# Patient Record
Sex: FEMALE | Race: WHITE | NOT HISPANIC OR LATINO
[De-identification: names, ages, dates, MRNs, and addresses within clinical notes are randomized per-mention and may not be internally consistent; named-entity substitution may affect disease eponyms.]

---

## 2018-11-30 ENCOUNTER — RECORD ABSTRACTING (OUTPATIENT)
Age: 43
End: 2018-11-30

## 2018-11-30 DIAGNOSIS — Z87.898 PERSONAL HISTORY OF OTHER SPECIFIED CONDITIONS: ICD-10-CM

## 2018-11-30 DIAGNOSIS — J40 BRONCHITIS, NOT SPECIFIED AS ACUTE OR CHRONIC: ICD-10-CM

## 2018-11-30 DIAGNOSIS — Z87.59 PERSONAL HISTORY OF OTHER COMPLICATIONS OF PREGNANCY, CHILDBIRTH AND THE PUERPERIUM: ICD-10-CM

## 2018-11-30 DIAGNOSIS — M79.18 MYALGIA, OTHER SITE: ICD-10-CM

## 2018-11-30 DIAGNOSIS — Z82.3 FAMILY HISTORY OF STROKE: ICD-10-CM

## 2018-11-30 DIAGNOSIS — M62.838 OTHER MUSCLE SPASM: ICD-10-CM

## 2018-11-30 DIAGNOSIS — R68.89 OTHER GENERAL SYMPTOMS AND SIGNS: ICD-10-CM

## 2018-11-30 DIAGNOSIS — J30.2 OTHER SEASONAL ALLERGIC RHINITIS: ICD-10-CM

## 2018-11-30 DIAGNOSIS — R09.81 NASAL CONGESTION: ICD-10-CM

## 2018-11-30 DIAGNOSIS — Z80.0 FAMILY HISTORY OF MALIGNANT NEOPLASM OF DIGESTIVE ORGANS: ICD-10-CM

## 2018-11-30 DIAGNOSIS — Z82.49 FAMILY HISTORY OF ISCHEMIC HEART DISEASE AND OTHER DISEASES OF THE CIRCULATORY SYSTEM: ICD-10-CM

## 2018-11-30 DIAGNOSIS — J32.9 CHRONIC SINUSITIS, UNSPECIFIED: ICD-10-CM

## 2018-11-30 DIAGNOSIS — Z91.030 BEE ALLERGY STATUS: ICD-10-CM

## 2018-11-30 DIAGNOSIS — Z92.89 PERSONAL HISTORY OF OTHER MEDICAL TREATMENT: ICD-10-CM

## 2018-11-30 DIAGNOSIS — M25.569 PAIN IN UNSPECIFIED KNEE: ICD-10-CM

## 2018-11-30 DIAGNOSIS — M79.603 PAIN IN ARM, UNSPECIFIED: ICD-10-CM

## 2018-11-30 RX ORDER — FEXOFENADINE HYDROCHLORIDE 180 MG/1
180 TABLET, FILM COATED ORAL DAILY
Refills: 0 | Status: ACTIVE | COMMUNITY

## 2018-11-30 RX ORDER — GLUCOSAMINE HCL/CHONDROITIN SU 500-400 MG
3 CAPSULE ORAL
Refills: 0 | Status: ACTIVE | COMMUNITY

## 2018-12-03 ENCOUNTER — APPOINTMENT (OUTPATIENT)
Dept: GASTROENTEROLOGY | Facility: CLINIC | Age: 43
End: 2018-12-03
Payer: COMMERCIAL

## 2018-12-03 VITALS
WEIGHT: 145 LBS | BODY MASS INDEX: 24.75 KG/M2 | HEIGHT: 64 IN | DIASTOLIC BLOOD PRESSURE: 80 MMHG | SYSTOLIC BLOOD PRESSURE: 120 MMHG | HEART RATE: 70 BPM

## 2018-12-03 DIAGNOSIS — Z87.898 PERSONAL HISTORY OF OTHER SPECIFIED CONDITIONS: ICD-10-CM

## 2018-12-03 PROCEDURE — 99213 OFFICE O/P EST LOW 20 MIN: CPT

## 2018-12-03 RX ORDER — ALBUTEROL SULFATE 90 UG/1
108 AEROSOL, METERED RESPIRATORY (INHALATION)
Refills: 0 | Status: DISCONTINUED | COMMUNITY
End: 2018-12-03

## 2018-12-03 RX ORDER — AZITHROMYCIN DIHYDRATE 250 MG/1
250 TABLET, FILM COATED ORAL
Refills: 0 | Status: DISCONTINUED | COMMUNITY
End: 2018-12-03

## 2018-12-03 RX ORDER — AMOXICILLIN AND CLAVULANATE POTASSIUM 875; 125 MG/1; 1/1
875-125 TABLET, FILM COATED ORAL
Refills: 0 | Status: DISCONTINUED | COMMUNITY
End: 2018-12-03

## 2018-12-03 RX ORDER — MOMETASONE FUROATE MONOHYDRATE 50 UG/1
50 SPRAY, METERED NASAL
Refills: 0 | Status: DISCONTINUED | COMMUNITY
End: 2018-12-03

## 2018-12-03 RX ORDER — FLUCONAZOLE 150 MG/1
150 TABLET ORAL
Refills: 0 | Status: DISCONTINUED | COMMUNITY
End: 2018-12-03

## 2019-01-23 ENCOUNTER — APPOINTMENT (OUTPATIENT)
Dept: INTERNAL MEDICINE | Facility: CLINIC | Age: 44
End: 2019-01-23
Payer: COMMERCIAL

## 2019-01-23 VITALS
DIASTOLIC BLOOD PRESSURE: 76 MMHG | TEMPERATURE: 98.6 F | BODY MASS INDEX: 24.75 KG/M2 | OXYGEN SATURATION: 99 % | SYSTOLIC BLOOD PRESSURE: 120 MMHG | HEART RATE: 74 BPM | WEIGHT: 145 LBS | HEIGHT: 64 IN

## 2019-01-23 DIAGNOSIS — Z87.440 PERSONAL HISTORY OF URINARY (TRACT) INFECTIONS: ICD-10-CM

## 2019-01-23 PROCEDURE — 90471 IMMUNIZATION ADMIN: CPT

## 2019-01-23 PROCEDURE — 90707 MMR VACCINE SC: CPT

## 2019-01-23 PROCEDURE — 99213 OFFICE O/P EST LOW 20 MIN: CPT | Mod: 25

## 2019-01-25 NOTE — HISTORY OF PRESENT ILLNESS
[FreeTextEntry8] : UTI SYMPS- PATIENT HAS BEEN ON 3 DIFF ABX\par \par Patient presents to the office today for recurrent uti. Patient is now in a new relationship and is very happy. HOwever has had UTi 3 times. Patient reports burning with urination.\par \par

## 2019-01-29 LAB
APPEARANCE: CLEAR
BILIRUBIN URINE: NEGATIVE
BLOOD URINE: NEGATIVE
COLOR: YELLOW
GLUCOSE QUALITATIVE U: NEGATIVE MG/DL
KETONES URINE: NEGATIVE
LEUKOCYTE ESTERASE URINE: NEGATIVE
NITRITE URINE: NEGATIVE
PH URINE: 6.5
PROTEIN URINE: NEGATIVE MG/DL
SPECIFIC GRAVITY URINE: 1.01
UROBILINOGEN URINE: NEGATIVE MG/DL

## 2019-09-09 ENCOUNTER — APPOINTMENT (OUTPATIENT)
Dept: INTERNAL MEDICINE | Facility: CLINIC | Age: 44
End: 2019-09-09
Payer: COMMERCIAL

## 2019-09-09 VITALS
BODY MASS INDEX: 24.75 KG/M2 | OXYGEN SATURATION: 98 % | WEIGHT: 145 LBS | HEART RATE: 82 BPM | HEIGHT: 64 IN | SYSTOLIC BLOOD PRESSURE: 120 MMHG | DIASTOLIC BLOOD PRESSURE: 72 MMHG

## 2019-09-09 DIAGNOSIS — D50.0 IRON DEFICIENCY ANEMIA SECONDARY TO BLOOD LOSS (CHRONIC): ICD-10-CM

## 2019-09-09 DIAGNOSIS — F90.9 ATTENTION-DEFICIT HYPERACTIVITY DISORDER, UNSPECIFIED TYPE: ICD-10-CM

## 2019-09-09 DIAGNOSIS — D50.9 IRON DEFICIENCY ANEMIA, UNSPECIFIED: ICD-10-CM

## 2019-09-09 PROCEDURE — 99214 OFFICE O/P EST MOD 30 MIN: CPT

## 2019-09-11 ENCOUNTER — RX RENEWAL (OUTPATIENT)
Age: 44
End: 2019-09-11

## 2019-09-11 PROBLEM — F90.9 ADHD: Status: ACTIVE | Noted: 2019-09-09

## 2019-09-11 PROBLEM — D50.0 IRON DEFICIENCY ANEMIA DUE TO CHRONIC BLOOD LOSS: Status: ACTIVE | Noted: 2019-09-11

## 2019-09-11 PROBLEM — D50.9 IRON DEFICIENCY ANEMIA: Status: ACTIVE | Noted: 2019-09-11

## 2019-09-12 ENCOUNTER — RESULT REVIEW (OUTPATIENT)
Age: 44
End: 2019-09-12

## 2020-06-12 ENCOUNTER — APPOINTMENT (OUTPATIENT)
Dept: GASTROENTEROLOGY | Facility: CLINIC | Age: 45
End: 2020-06-12
Payer: COMMERCIAL

## 2020-06-12 VITALS
BODY MASS INDEX: 23.9 KG/M2 | SYSTOLIC BLOOD PRESSURE: 120 MMHG | WEIGHT: 140 LBS | HEIGHT: 64 IN | DIASTOLIC BLOOD PRESSURE: 80 MMHG | TEMPERATURE: 98 F | HEART RATE: 70 BPM

## 2020-06-12 DIAGNOSIS — Z12.11 ENCOUNTER FOR SCREENING FOR MALIGNANT NEOPLASM OF COLON: ICD-10-CM

## 2020-06-12 PROCEDURE — 99214 OFFICE O/P EST MOD 30 MIN: CPT

## 2020-06-12 RX ORDER — PANTOPRAZOLE 40 MG/1
40 TABLET, DELAYED RELEASE ORAL
Refills: 0 | Status: DISCONTINUED | COMMUNITY
End: 2020-06-12

## 2020-06-12 RX ORDER — ASPIRIN ENTERIC COATED TABLETS 81 MG 81 MG/1
81 TABLET, DELAYED RELEASE ORAL DAILY
Refills: 0 | Status: DISCONTINUED | COMMUNITY
End: 2020-06-12

## 2020-06-12 RX ORDER — CIPROFLOXACIN HYDROCHLORIDE 250 MG/1
250 TABLET, FILM COATED ORAL
Qty: 10 | Refills: 0 | Status: DISCONTINUED | COMMUNITY
Start: 2019-01-23 | End: 2020-06-12

## 2020-06-12 RX ORDER — DEXLANSOPRAZOLE 30 MG/1
30 CAPSULE, DELAYED RELEASE ORAL DAILY
Qty: 90 | Refills: 1 | Status: DISCONTINUED | COMMUNITY
Start: 2018-12-03 | End: 2020-06-12

## 2020-06-12 RX ORDER — SERTRALINE 25 MG/1
25 TABLET, FILM COATED ORAL DAILY
Qty: 90 | Refills: 0 | Status: DISCONTINUED | COMMUNITY
Start: 2018-12-04 | End: 2020-06-12

## 2020-06-12 RX ORDER — DEXMETHYLPHENIDATE HYDROCHLORIDE 10 MG/1
10 CAPSULE, EXTENDED RELEASE ORAL DAILY
Qty: 30 | Refills: 0 | Status: DISCONTINUED | COMMUNITY
Start: 2019-09-09 | End: 2020-06-12

## 2020-06-12 NOTE — ASSESSMENT
[FreeTextEntry1] : Will plan on an upper endoscopy for GERD.  Explained risks/benefits/alternatives including not limited to bleeding, infection, perforation, missed lesions, anesthesia complications.  Patient understands and agrees, all questions answered.\par \par Will plan on a colonoscopy for screening given her family history of an appendiceal cancer in her brother.  Explained risks/benefits/alternatives including not limited to bleeding, infection, perforation, missed lesions, anesthesia complications.  Patient understands and agrees, all questions answered.  Will use a split dose miralax/gatorade prep with clears the day prior.

## 2020-06-12 NOTE — HISTORY OF PRESENT ILLNESS
Reason For Visit    Patient presents for follow-up . Risperdal Consta 50 mg given to right deltoid muscle. Lot # [id number], Expiration date 04/19. Patient tolerated injection well.      Allergies   1. No Known Drug Allergies    Signatures   Electronically signed by : Sole Romeo R.N.; May  4 2017 11:54AM CST (Author)    
[FreeTextEntry1] : Ms. Gross returns for f/u today.\par \chiara Most recently seen 12/18 for LUQ discomfort in the setting of aspirin use, improved with Dexilant.  Prior to that she had an EGD 8/18 showing erosive gastritis.\par \par She had been well until the past few weeks when she developed recurrent burning LUQ discomfort, no radiation, not associated with eating.  No dysphagia, odynophagia, N/V, black stool, rectal bleeding, weight change.\par \par She recently started taking omeprazole for this discomfort, continues to have her discomfort despite the medication.\par \par She also reports a change in bowel habits, used to be mildly constipated, over the past few months she has been having 1-3 soft or loose brown BMs daily.  No mucus in stool.  Does have lower abdominal cramps that are relieved by passing stool.\par \chiara Does not smoke, drinks socially.\par \par Of note, her brother had an appendiceal cancer s/p surgical resection, did not need chemo.\par \chiara Has never had a colonoscopy.

## 2020-06-12 NOTE — PHYSICAL EXAM
[General Appearance - Alert] : alert [General Appearance - In No Acute Distress] : in no acute distress [Sclera] : the sclera and conjunctiva were normal [Outer Ear] : the ears and nose were normal in appearance [Neck Appearance] : the appearance of the neck was normal [] : no respiratory distress [Abdomen Soft] : soft [Abdomen Tenderness] : non-tender [Abnormal Walk] : normal gait [Skin Color & Pigmentation] : normal skin color and pigmentation [Oriented To Time, Place, And Person] : oriented to person, place, and time [Cranial Nerves] : cranial nerves 2-12 were intact

## 2020-07-19 ENCOUNTER — RESULT REVIEW (OUTPATIENT)
Age: 45
End: 2020-07-19

## 2020-07-21 ENCOUNTER — RESULT REVIEW (OUTPATIENT)
Age: 45
End: 2020-07-21

## 2020-07-22 ENCOUNTER — APPOINTMENT (OUTPATIENT)
Dept: GASTROENTEROLOGY | Facility: HOSPITAL | Age: 45
End: 2020-07-22

## 2020-07-22 ENCOUNTER — RESULT REVIEW (OUTPATIENT)
Age: 45
End: 2020-07-22

## 2020-12-21 PROBLEM — Z87.440 HISTORY OF URINARY TRACT INFECTION: Status: RESOLVED | Noted: 2019-01-23 | Resolved: 2020-12-21

## 2021-04-15 ENCOUNTER — APPOINTMENT (OUTPATIENT)
Dept: INTERNAL MEDICINE | Facility: CLINIC | Age: 46
End: 2021-04-15
Payer: COMMERCIAL

## 2021-04-15 VITALS
BODY MASS INDEX: 23.9 KG/M2 | DIASTOLIC BLOOD PRESSURE: 72 MMHG | SYSTOLIC BLOOD PRESSURE: 110 MMHG | WEIGHT: 140 LBS | HEIGHT: 64 IN | OXYGEN SATURATION: 99 % | HEART RATE: 76 BPM

## 2021-04-15 DIAGNOSIS — M79.18 MYALGIA, OTHER SITE: ICD-10-CM

## 2021-04-15 PROCEDURE — 99072 ADDL SUPL MATRL&STAF TM PHE: CPT

## 2021-04-15 PROCEDURE — 99214 OFFICE O/P EST MOD 30 MIN: CPT

## 2021-06-11 ENCOUNTER — APPOINTMENT (OUTPATIENT)
Dept: INTERNAL MEDICINE | Facility: CLINIC | Age: 46
End: 2021-06-11
Payer: COMMERCIAL

## 2021-06-11 VITALS
BODY MASS INDEX: 23.9 KG/M2 | WEIGHT: 140 LBS | HEIGHT: 64 IN | SYSTOLIC BLOOD PRESSURE: 120 MMHG | HEART RATE: 72 BPM | DIASTOLIC BLOOD PRESSURE: 62 MMHG | OXYGEN SATURATION: 98 %

## 2021-06-11 PROCEDURE — 99214 OFFICE O/P EST MOD 30 MIN: CPT

## 2021-06-14 ENCOUNTER — RESULT REVIEW (OUTPATIENT)
Age: 46
End: 2021-06-14

## 2021-06-28 ENCOUNTER — RESULT REVIEW (OUTPATIENT)
Age: 46
End: 2021-06-28

## 2021-11-02 ENCOUNTER — NON-APPOINTMENT (OUTPATIENT)
Age: 46
End: 2021-11-02

## 2021-11-02 ENCOUNTER — APPOINTMENT (OUTPATIENT)
Dept: PAIN MANAGEMENT | Facility: CLINIC | Age: 46
End: 2021-11-02
Payer: COMMERCIAL

## 2021-11-02 VITALS
BODY MASS INDEX: 25.61 KG/M2 | SYSTOLIC BLOOD PRESSURE: 121 MMHG | WEIGHT: 150 LBS | DIASTOLIC BLOOD PRESSURE: 89 MMHG | HEIGHT: 64 IN | TEMPERATURE: 98 F

## 2021-11-02 DIAGNOSIS — M51.36 OTHER INTERVERTEBRAL DISC DEGENERATION, LUMBAR REGION: ICD-10-CM

## 2021-11-02 PROCEDURE — 99204 OFFICE O/P NEW MOD 45 MIN: CPT

## 2021-11-02 NOTE — PHYSICAL EXAM

## 2021-11-02 NOTE — DATA REVIEWED
[FreeTextEntry1] : 06/28/21\par  MRI LUMBAR SPINE\par \par  FINDINGS AT MULTIPLE LEVELS: There is disc degeneration at L1-L2, L4-L5, L5-S1 with loss of normal\par high signal in the intervertebral discs on the sagittal T2-weighted sequence.\par \par  FINDINGS AT SPECIFIC LEVELS:\par \par      L5-S1: No bulge or herniation.\par \par      L4-L5: Mild facet osteoarthritis. Mild disc bulge with associated left-sided annular fissure.\par \par      L3-L4: No bulge or herniation.\par \par      L2-L3: No bulge or herniation.\par \par      L1-L2: Minimal disc thinning. Mild disc bulging.\par \par      T12-L1: No bulge or herniation.\par \par  INTRADURAL SPACE AND CONUS MEDULLARIS: No lesion demonstrated.\par \par  BONE MARROW SIGNAL: Subchondral degenerative changes L5-S1.\par \par  ALIGNMENT: No change in mild thoracolumbar levoscoliosis.\par \par  PARASPINAL SOFT TISSUES: There is no paraspinal mass. There are small bilateral adnexal cysts. In a\par premenopausal patient, the findings are consistent with physiologic polyp.\par \par \par \par  IMPRESSION: Mild disc bulging. No disc herniation or spinal stenosis.\par

## 2021-11-02 NOTE — HISTORY OF PRESENT ILLNESS
[4] : 3. What number best describes how, during the past week, pain has interfered with your general activity? 4/10 pain [___ yrs] : [unfilled] year(s) ago [Paroxysmal] : paroxysmal [5] : an average pain level of 5/10 [1] : a minimum pain level of 1/10 [10] : a maximum pain level of 10/10 [Sharp] : sharp [Dull] : dull [Aching] : aching [Throbbing] : throbbing [Burning] : burning [Shooting] : shooting [Electric] : electric [Sitting] : sitting [Medications] : medications [Heat] : heat [FreeTextEntry1] : 46 yof presents w/ low back pain that radiates down the left lower extremity. Pain began after doing HIT workouts and running more. Quality of life is impaired. There has been a severe exacerbation of the patient's chronic pain. She works as a psychologist. Cannot lift due to pain.  [FreeTextEntry2] : 12 [FreeTextEntry7] : No referral.   Lower back bilateral legs mostly left side . [FreeTextEntry3] : exercise [FreeTextEntry4] : exercise

## 2021-11-02 NOTE — ASSESSMENT
[FreeTextEntry1] : 46 yof w/ low back pain that radiates down the left leg.\par \par I have personally reviewed the patient's MRI in detail and discussed it with them which is significant for disc bulge and annular fissure at L4-L5.\par \par The patient has failed to have relief with medication management and physical therapy. Given the patients failure to improve with all other conservative measures, recommend left L4-L5 transforaminal epidural steroid injection under fluoroscopic guidance. The patient will follow-up with me in my office two weeks following intervention.\par \par I have discussed in detail with the patient that an interventional spine procedure is associated with potential risks. The procedure may include an injection of steroid and potentially other medications (local anesthetic and normal saline) into the epidural space or surrounding tissue of the spine. There are significant risks of this procedure which include and are not limited to infection, bleeding, worsening pain, dural puncture leading to post-dural puncture headache, nerve damage, spinal cord injury, paralysis, stroke, and death. There is a chance that the procedure does not improve their pain. There are risks associated with the steroid being absorbed into the body systemically. These include dysphoria, difficulty sleeping, mood swings, and personality changes. Pre-menopausal women may notice a regularity his in her menstrual cycle for 2-3 months following the injection. Steroids can specifically affect patients with hypertension, diabetes, and peptic ulcers. The procedure may cause a temporary increase in blood pressure and blood glucose, and may adversely affect a peptic ulcer. Other, more rare complications, including avascular necrosis of the joints, glaucoma, and osteoporosis. I have discussed the risks of the procedure at length with the patient, and the potential benefits of pain relief. I have offered alternatives to the procedure. All questions were answered. The patient expressed understanding and wishes to proceed with the procedure.\par \par Acetaminophen 1,000 mg q8h prn

## 2021-11-02 NOTE — REVIEW OF SYSTEMS
[Back Pain] : back pain [Neck Pain] : neck pain [Muscle Pain] : muscle pain [Numbness] : numbness [Negative] : Heme/Lymph

## 2021-11-02 NOTE — CONSULT LETTER
[Dear  ___] : Dear  [unfilled], [Consult Letter:] : I had the pleasure of evaluating your patient, [unfilled]. [Please see my note below.] : Please see my note below. [Consult Closing:] : Thank you very much for allowing me to participate in the care of this patient.  If you have any questions, please do not hesitate to contact me. [Sincerely,] : Sincerely, [FreeTextEntry3] : Germain Odonnell MD\par

## 2021-11-08 ENCOUNTER — RESULT REVIEW (OUTPATIENT)
Age: 46
End: 2021-11-08

## 2021-11-10 ENCOUNTER — APPOINTMENT (OUTPATIENT)
Dept: PAIN MANAGEMENT | Facility: HOSPITAL | Age: 46
End: 2021-11-10

## 2021-11-10 ENCOUNTER — RESULT REVIEW (OUTPATIENT)
Age: 46
End: 2021-11-10

## 2021-12-02 ENCOUNTER — APPOINTMENT (OUTPATIENT)
Dept: PAIN MANAGEMENT | Facility: CLINIC | Age: 46
End: 2021-12-02
Payer: COMMERCIAL

## 2021-12-02 VITALS
DIASTOLIC BLOOD PRESSURE: 83 MMHG | HEIGHT: 64 IN | WEIGHT: 150 LBS | SYSTOLIC BLOOD PRESSURE: 122 MMHG | TEMPERATURE: 98 F | BODY MASS INDEX: 25.61 KG/M2

## 2021-12-02 DIAGNOSIS — M62.838 OTHER MUSCLE SPASM: ICD-10-CM

## 2021-12-02 PROCEDURE — 99213 OFFICE O/P EST LOW 20 MIN: CPT

## 2021-12-02 NOTE — ASSESSMENT
[FreeTextEntry1] : 46 yof w/ low back pain that radiates down the left leg now s/p GIOVANNY with complete relief of pain.\par \par I have personally reviewed the patient's MRI in detail and discussed it with them which is significant for disc bulge and annular fissure at L4-L5.\par \par Physical therapy prescribed - goal will be to increase ROM, strengthening, postural training, other modalities ad vesta which may include massage and stim.\par \par HEP discussed in detail.\par \par RTC prn.

## 2021-12-02 NOTE — HISTORY OF PRESENT ILLNESS
[4] : 3. What number best describes how, during the past week, pain has interfered with your general activity? 4/10 pain [___ yrs] : [unfilled] year(s) ago [Paroxysmal] : paroxysmal [5] : an average pain level of 5/10 [1] : a minimum pain level of 1/10 [10] : a maximum pain level of 10/10 [Sharp] : sharp [Dull] : dull [Aching] : aching [Throbbing] : throbbing [Burning] : burning [Shooting] : shooting [Electric] : electric [Sitting] : sitting [Medications] : medications [Heat] : heat [FreeTextEntry1] : Interval History:\par She is s/p GIOVANNY with excellent relief. She wishes to discuss HEP. No longer has any pain.\par \par HPI: 46 yof presents w/ low back pain that radiates down the left lower extremity. Pain began after doing HIT workouts and running more. Quality of life is impaired. There has been a severe exacerbation of the patient's chronic pain. She works as a psychologist. Cannot lift due to pain. \par \par Interventions:\par Left L4-L5 TFESI (11/10/21): [FreeTextEntry2] : 12 [FreeTextEntry7] : No referral.   Lower back bilateral legs mostly left side . [FreeTextEntry3] : exercise [FreeTextEntry4] : exercise

## 2021-12-02 NOTE — PHYSICAL EXAM

## 2022-01-13 ENCOUNTER — APPOINTMENT (OUTPATIENT)
Dept: INTERNAL MEDICINE | Facility: CLINIC | Age: 47
End: 2022-01-13
Payer: COMMERCIAL

## 2022-01-13 VITALS
DIASTOLIC BLOOD PRESSURE: 80 MMHG | BODY MASS INDEX: 25.61 KG/M2 | SYSTOLIC BLOOD PRESSURE: 120 MMHG | WEIGHT: 150 LBS | OXYGEN SATURATION: 99 % | HEIGHT: 64 IN | HEART RATE: 82 BPM

## 2022-01-13 PROCEDURE — 99213 OFFICE O/P EST LOW 20 MIN: CPT | Mod: 25

## 2022-01-13 NOTE — HISTORY OF PRESENT ILLNESS
[FreeTextEntry1] : easily bruising  [de-identified] : 1. on and off easy bruising, no other issues. Does not develop large hematomas

## 2022-01-21 LAB
BASOPHILS # BLD AUTO: 0.08 K/UL
BASOPHILS NFR BLD AUTO: 1.4 %
EOSINOPHIL # BLD AUTO: 0.08 K/UL
EOSINOPHIL NFR BLD AUTO: 1.4 %
HCT VFR BLD CALC: 38.4 %
HGB BLD-MCNC: 12.7 G/DL
IMM GRANULOCYTES NFR BLD AUTO: 0 %
LYMPHOCYTES # BLD AUTO: 1.6 K/UL
LYMPHOCYTES NFR BLD AUTO: 27.8 %
MAN DIFF?: NORMAL
MCHC RBC-ENTMCNC: 29.8 PG
MCHC RBC-ENTMCNC: 33.1 GM/DL
MCV RBC AUTO: 90.1 FL
MONOCYTES # BLD AUTO: 0.54 K/UL
MONOCYTES NFR BLD AUTO: 9.4 %
NEUTROPHILS # BLD AUTO: 3.45 K/UL
NEUTROPHILS NFR BLD AUTO: 60 %
PLATELET # BLD AUTO: 409 K/UL
RBC # BLD: 4.26 M/UL
RBC # FLD: 12.5 %
WBC # FLD AUTO: 5.75 K/UL

## 2022-04-08 ENCOUNTER — APPOINTMENT (OUTPATIENT)
Dept: GASTROENTEROLOGY | Facility: CLINIC | Age: 47
End: 2022-04-08
Payer: COMMERCIAL

## 2022-04-08 VITALS
OXYGEN SATURATION: 98 % | HEIGHT: 64 IN | BODY MASS INDEX: 26.46 KG/M2 | DIASTOLIC BLOOD PRESSURE: 72 MMHG | TEMPERATURE: 97.9 F | WEIGHT: 155 LBS | SYSTOLIC BLOOD PRESSURE: 112 MMHG | RESPIRATION RATE: 16 BRPM | HEART RATE: 72 BPM

## 2022-04-08 DIAGNOSIS — R10.13 EPIGASTRIC PAIN: ICD-10-CM

## 2022-04-08 PROCEDURE — 99214 OFFICE O/P EST MOD 30 MIN: CPT

## 2022-04-08 NOTE — ASSESSMENT
[FreeTextEntry1] : Famotidine PRN.\par \par Can continue with probiotics as she has had a good response to them.\par \par Will obtain an abd U/S r/o biliary sources of her discomfort.\par \par Dairy free diet.

## 2022-04-08 NOTE — HISTORY OF PRESENT ILLNESS
[FreeTextEntry1] : Ms. Gross returns for f/u.\par \par Last seen 6/20 for recurrent LUQ discomfort.  She had previously responded well to PPIs.\par \par Approximately 1 month ago she had a diarrheal illness that has since passed.  She has had on and off LUQ burning pain, she is currently taking omeprazole which has helped however has not led to complete resolution.  She has recently been c/o mild RUQ discomfort as well, burning, more mild but similar to her LUQ discomfort. No N/V, regurgitation, heartburn.\par \par EGD/colonoscopy 7/20: cecal tubular adenoma (3 mm), int hemorrhoids, normal EGD\par EGD 8/18: erosive gastritis

## 2022-04-26 ENCOUNTER — APPOINTMENT (OUTPATIENT)
Dept: PAIN MANAGEMENT | Facility: CLINIC | Age: 47
End: 2022-04-26
Payer: COMMERCIAL

## 2022-04-26 VITALS
WEIGHT: 155 LBS | DIASTOLIC BLOOD PRESSURE: 86 MMHG | SYSTOLIC BLOOD PRESSURE: 119 MMHG | HEIGHT: 64 IN | TEMPERATURE: 98 F | BODY MASS INDEX: 26.46 KG/M2

## 2022-04-26 PROCEDURE — 99214 OFFICE O/P EST MOD 30 MIN: CPT

## 2022-04-26 RX ORDER — NAPROXEN 250 MG/1
250 TABLET ORAL
Qty: 20 | Refills: 0 | Status: DISCONTINUED | COMMUNITY
Start: 2021-04-15 | End: 2022-04-26

## 2022-04-26 RX ORDER — FLUTICASONE PROPIONATE 110 UG/1
110 AEROSOL, METERED RESPIRATORY (INHALATION) TWICE DAILY
Qty: 12 | Refills: 3 | Status: DISCONTINUED | COMMUNITY
Start: 2019-09-11 | End: 2022-04-26

## 2022-04-26 RX ORDER — METHOCARBAMOL 500 MG/1
500 TABLET, FILM COATED ORAL 3 TIMES DAILY
Qty: 21 | Refills: 0 | Status: DISCONTINUED | COMMUNITY
Start: 2021-04-20 | End: 2022-04-26

## 2022-04-26 NOTE — HISTORY OF PRESENT ILLNESS
[___ yrs] : [unfilled] year(s) ago [Paroxysmal] : paroxysmal [5] : an average pain level of 5/10 [1] : a minimum pain level of 1/10 [10] : a maximum pain level of 10/10 [Sharp] : sharp [Dull] : dull [Aching] : aching [Throbbing] : throbbing [Burning] : burning [Shooting] : shooting [Electric] : electric [Sitting] : sitting [Medications] : medications [Heat] : heat [4] : 3. What number best describes how, during the past week, pain has interfered with your general activity? 4/10 pain [FreeTextEntry1] : Interval History:\par Patient returns for follow-up. She underwent GIOVANNY in November with excellent results but has some pain returning. Quality of life is impaired. There has been a severe exacerbation of the patient's chronic pain. She wishes to pursue intervention again. \par \par HPI: 46 yof presents w/ low back pain that radiates down the left lower extremity. Pain began after doing HIT workouts and running more. Quality of life is impaired. There has been a severe exacerbation of the patient's chronic pain. She works as a psychologist. Cannot lift due to pain. \par \par Interventions:\par Left L4-L5 TFESI (11/10/21): [FreeTextEntry7] : No referral.   Lower back bilateral legs mostly left side . [FreeTextEntry3] : exercise [FreeTextEntry4] : exercise [FreeTextEntry2] : 12

## 2022-04-26 NOTE — ASSESSMENT
[FreeTextEntry1] : 46 yof w/ low back pain that radiates down the left leg now s/p GIOVANNY with complete relief of pain, however, pain has returned.\par \par I have personally reviewed the patient's MRI in detail and discussed it with them which is significant for disc bulge and annular fissure at L4-L5.\par \par The patient has failed to have relief with medication management. The patient has failed to have relief with more then six weeks of physical therapy within the last three months. Given the patients failure to improve with all other conservative measures, recommend left L4-L5 transforaminal epidural steroid injection under fluoroscopic guidance. The patient will follow-up with me in my office two weeks following intervention.\par \par I have discussed in detail with the patient that an interventional spine procedure is associated with potential risks. The procedure may include an injection of steroid and potentially other medications (local anesthetic and normal saline) into the epidural space or surrounding tissue of the spine. There are significant risks of this procedure which include and are not limited to infection, bleeding, worsening pain, dural puncture leading to post-dural puncture headache, nerve damage, spinal cord injury, paralysis, stroke, and death. There is a chance that the procedure does not improve their pain. There are risks associated with the steroid being absorbed into the body systemically. These include dysphoria, difficulty sleeping, mood swings, and personality changes. Pre-menopausal women may notice a regularity his in her menstrual cycle for 2-3 months following the injection. Steroids can specifically affect patients with hypertension, diabetes, and peptic ulcers. The procedure may cause a temporary increase in blood pressure and blood glucose, and may adversely affect a peptic ulcer. Other, more rare complications, including avascular necrosis of the joints, glaucoma, and osteoporosis. I have discussed the risks of the procedure at length with the patient, and the potential benefits of pain relief. I have offered alternatives to the procedure. All questions were answered. The patient expressed understanding and wishes to proceed with the procedure.\par \par Physical therapy prescribed - goal will be to increase ROM, strengthening, postural training, other modalities ad vesta which may include massage and stim. Goals of therapy discussed with the patient in detail and will be discussed with physical therapist. Patient will follow-up following course of physical therapy to monitor progress and adjust therapy as needed.\par \par Acetaminophen 1,000 mg q8h prn for moderate pain. Risks, benefits, and alternatives of acetaminophen discussed with patient.\par \par Ibuprofen 600 mg q8h prn add when pain is not adequately controlled with acetaminophen. Risks, benefits, and alternatives of ibuprofen discussed with patient.\par \par Diet and nutritional strategies discussed which may improve patients pain and will improve overall health.\par \par Cyclobenzaprine for muscular pain.\par \par

## 2022-04-26 NOTE — PHYSICAL EXAM

## 2022-05-11 ENCOUNTER — APPOINTMENT (OUTPATIENT)
Dept: PAIN MANAGEMENT | Facility: HOSPITAL | Age: 47
End: 2022-05-11

## 2022-05-11 ENCOUNTER — RESULT REVIEW (OUTPATIENT)
Age: 47
End: 2022-05-11

## 2022-05-11 ENCOUNTER — TRANSCRIPTION ENCOUNTER (OUTPATIENT)
Age: 47
End: 2022-05-11

## 2022-05-12 ENCOUNTER — TRANSCRIPTION ENCOUNTER (OUTPATIENT)
Age: 47
End: 2022-05-12

## 2022-05-12 ENCOUNTER — RESULT REVIEW (OUTPATIENT)
Age: 47
End: 2022-05-12

## 2022-05-18 ENCOUNTER — RESULT REVIEW (OUTPATIENT)
Age: 47
End: 2022-05-18

## 2022-05-25 DIAGNOSIS — D35.2 BENIGN NEOPLASM OF PITUITARY GLAND: ICD-10-CM

## 2022-06-03 ENCOUNTER — APPOINTMENT (OUTPATIENT)
Dept: INTERNAL MEDICINE | Facility: CLINIC | Age: 47
End: 2022-06-03
Payer: COMMERCIAL

## 2022-06-03 VITALS
RESPIRATION RATE: 16 BRPM | HEART RATE: 95 BPM | HEIGHT: 64 IN | OXYGEN SATURATION: 99 % | SYSTOLIC BLOOD PRESSURE: 125 MMHG | TEMPERATURE: 97.5 F | DIASTOLIC BLOOD PRESSURE: 80 MMHG

## 2022-06-03 DIAGNOSIS — R23.8 OTHER SKIN CHANGES: ICD-10-CM

## 2022-06-03 PROCEDURE — 99213 OFFICE O/P EST LOW 20 MIN: CPT

## 2022-06-06 ENCOUNTER — RESULT REVIEW (OUTPATIENT)
Age: 47
End: 2022-06-06

## 2022-06-08 PROBLEM — R23.8 EASY BRUISING: Status: ACTIVE | Noted: 2022-01-13

## 2022-06-10 LAB
APTT BLD: 31.1 SEC
BASOPHILS # BLD AUTO: 0.07 K/UL
BASOPHILS NFR BLD AUTO: 0.9 %
EOSINOPHIL # BLD AUTO: 0.11 K/UL
EOSINOPHIL NFR BLD AUTO: 1.5 %
HCT VFR BLD CALC: 38.1 %
HGB BLD-MCNC: 12.4 G/DL
IMM GRANULOCYTES NFR BLD AUTO: 0.1 %
INR PPP: 0.97 RATIO
LYMPHOCYTES # BLD AUTO: 2.21 K/UL
LYMPHOCYTES NFR BLD AUTO: 29.9 %
MAN DIFF?: NORMAL
MCHC RBC-ENTMCNC: 29.2 PG
MCHC RBC-ENTMCNC: 32.5 GM/DL
MCV RBC AUTO: 89.9 FL
MONOCYTES # BLD AUTO: 0.64 K/UL
MONOCYTES NFR BLD AUTO: 8.7 %
NEUTROPHILS # BLD AUTO: 4.34 K/UL
NEUTROPHILS NFR BLD AUTO: 58.9 %
PLATELET # BLD AUTO: 465 K/UL
PT BLD: 11.4 SEC
RBC # BLD: 4.24 M/UL
RBC # FLD: 12.3 %
WBC # FLD AUTO: 7.38 K/UL

## 2022-09-29 ENCOUNTER — NON-APPOINTMENT (OUTPATIENT)
Age: 47
End: 2022-09-29

## 2022-11-11 ENCOUNTER — APPOINTMENT (OUTPATIENT)
Dept: INTERNAL MEDICINE | Facility: CLINIC | Age: 47
End: 2022-11-11

## 2022-11-11 VITALS
HEIGHT: 64 IN | DIASTOLIC BLOOD PRESSURE: 70 MMHG | OXYGEN SATURATION: 98 % | WEIGHT: 155 LBS | HEART RATE: 78 BPM | SYSTOLIC BLOOD PRESSURE: 110 MMHG | BODY MASS INDEX: 26.46 KG/M2

## 2022-11-11 DIAGNOSIS — Z00.00 ENCOUNTER FOR GENERAL ADULT MEDICAL EXAMINATION W/OUT ABNORMAL FINDINGS: ICD-10-CM

## 2022-11-11 DIAGNOSIS — N63.10 UNSPECIFIED LUMP IN THE RIGHT BREAST, UNSPECIFIED QUADRANT: ICD-10-CM

## 2022-11-11 PROCEDURE — 36415 COLL VENOUS BLD VENIPUNCTURE: CPT

## 2022-11-11 PROCEDURE — 99396 PREV VISIT EST AGE 40-64: CPT | Mod: 25

## 2022-11-11 PROCEDURE — G0444 DEPRESSION SCREEN ANNUAL: CPT | Mod: 59

## 2022-11-17 LAB
25(OH)D3 SERPL-MCNC: 24.1 NG/ML
ALBUMIN SERPL ELPH-MCNC: 4.3 G/DL
ALP BLD-CCNC: 52 U/L
ALT SERPL-CCNC: 13 U/L
ANION GAP SERPL CALC-SCNC: 13 MMOL/L
APPEARANCE: CLEAR
AST SERPL-CCNC: 18 U/L
BASOPHILS # BLD AUTO: 0.1 K/UL
BASOPHILS NFR BLD AUTO: 1.7 %
BILIRUB SERPL-MCNC: 0.3 MG/DL
BILIRUBIN URINE: NEGATIVE
BLOOD URINE: NEGATIVE
BUN SERPL-MCNC: 9 MG/DL
CALCIUM SERPL-MCNC: 9.3 MG/DL
CHLORIDE SERPL-SCNC: 104 MMOL/L
CHOLEST SERPL-MCNC: 179 MG/DL
CO2 SERPL-SCNC: 21 MMOL/L
COLOR: COLORLESS
CREAT SERPL-MCNC: 0.53 MG/DL
EGFR: 115 ML/MIN/1.73M2
EOSINOPHIL # BLD AUTO: 0.12 K/UL
EOSINOPHIL NFR BLD AUTO: 2 %
ESTIMATED AVERAGE GLUCOSE: 108 MG/DL
GLUCOSE QUALITATIVE U: NEGATIVE
GLUCOSE SERPL-MCNC: 94 MG/DL
HBA1C MFR BLD HPLC: 5.4 %
HCT VFR BLD CALC: 37.7 %
HDLC SERPL-MCNC: 64 MG/DL
HGB BLD-MCNC: 12.5 G/DL
IMM GRANULOCYTES NFR BLD AUTO: 0.3 %
KETONES URINE: NEGATIVE
LDLC SERPL CALC-MCNC: 92 MG/DL
LEUKOCYTE ESTERASE URINE: NEGATIVE
LYMPHOCYTES # BLD AUTO: 1.71 K/UL
LYMPHOCYTES NFR BLD AUTO: 28.5 %
MAN DIFF?: NORMAL
MCHC RBC-ENTMCNC: 29.6 PG
MCHC RBC-ENTMCNC: 33.2 GM/DL
MCV RBC AUTO: 89.1 FL
MONOCYTES # BLD AUTO: 0.55 K/UL
MONOCYTES NFR BLD AUTO: 9.2 %
NEUTROPHILS # BLD AUTO: 3.5 K/UL
NEUTROPHILS NFR BLD AUTO: 58.3 %
NITRITE URINE: NEGATIVE
NONHDLC SERPL-MCNC: 114 MG/DL
PH URINE: 6.5
PLATELET # BLD AUTO: 460 K/UL
POTASSIUM SERPL-SCNC: 4.2 MMOL/L
PROT SERPL-MCNC: 6.4 G/DL
PROTEIN URINE: NEGATIVE
RBC # BLD: 4.23 M/UL
RBC # FLD: 12.4 %
SODIUM SERPL-SCNC: 138 MMOL/L
SPECIFIC GRAVITY URINE: 1
T4 SERPL-MCNC: 8.3 UG/DL
TRIGL SERPL-MCNC: 110 MG/DL
TSH SERPL-ACNC: 1.12 UIU/ML
UROBILINOGEN URINE: NORMAL
VIT B12 SERPL-MCNC: 641 PG/ML
WBC # FLD AUTO: 6 K/UL

## 2022-11-21 ENCOUNTER — NON-APPOINTMENT (OUTPATIENT)
Age: 47
End: 2022-11-21

## 2022-12-21 ENCOUNTER — RESULT REVIEW (OUTPATIENT)
Age: 47
End: 2022-12-21

## 2023-01-11 ENCOUNTER — RESULT REVIEW (OUTPATIENT)
Age: 48
End: 2023-01-11

## 2023-01-17 DIAGNOSIS — F41.9 ANXIETY DISORDER, UNSPECIFIED: ICD-10-CM

## 2023-01-18 ENCOUNTER — RESULT REVIEW (OUTPATIENT)
Age: 48
End: 2023-01-18

## 2023-01-27 ENCOUNTER — APPOINTMENT (OUTPATIENT)
Dept: BREAST CENTER | Facility: CLINIC | Age: 48
End: 2023-01-27
Payer: COMMERCIAL

## 2023-01-27 VITALS — HEIGHT: 64 IN | HEART RATE: 78 BPM | OXYGEN SATURATION: 99 % | TEMPERATURE: 97.9 F

## 2023-01-27 DIAGNOSIS — R92.0 MAMMOGRAPHIC MICROCALCIFICATION FOUND ON DIAGNOSTIC IMAGING OF BREAST: ICD-10-CM

## 2023-01-27 PROCEDURE — 99204 OFFICE O/P NEW MOD 45 MIN: CPT

## 2023-01-27 NOTE — REASON FOR VISIT
[Initial Evaluation] : an initial evaluation [FreeTextEntry1] : The patient comes in and is a premenopausal white female with a likely [place] Ashkenazi Jainism heritage with a family history of breast and possibly ovarian cancer.  She was found to have some calcifications in the upper central and outer aspect of the right breast and stereotactic core biopsy of the upper central calcifications came back with flat epithelial atypia.  She has an increased Ligia model lifetime risk of 36% and comes in now for a surgical evaluation.

## 2023-01-27 NOTE — REVIEW OF SYSTEMS
[Feeling Tired] : feeling tired [Recent Weight Gain (___ Lbs)] : recent [unfilled] ~Ulb weight gain [Diarrhea] : diarrhea [Hot Flashes] : hot flashes [Easy Bruising] : a tendency for easy bruising [Negative] : Psychiatric [FreeTextEntry8] : perimenopause

## 2023-01-27 NOTE — PHYSICAL EXAM
[Normocephalic] : normocephalic [Atraumatic] : atraumatic [EOMI] : extra ocular movement intact [Supple] : supple [No Supraclavicular Adenopathy] : no supraclavicular adenopathy [No Cervical Adenopathy] : no cervical adenopathy [Examined in the supine and seated position] : examined in the supine and seated position [No dominant masses] : no dominant masses in right breast  [No dominant masses] : no dominant masses left breast [No Nipple Retraction] : no left nipple retraction [No Nipple Discharge] : no left nipple discharge [Breast Mass Right Breast ___cm] : no masses [Breast Mass Left Breast ___cm] : no masses [Breast Nipple Inversion] : nipples not inverted [Breast Nipple Retraction] : nipples not retracted [Breast Nipple Flattening] : nipples not flattened [Breast Nipple Fissures] : nipples not fissured [Breast Abnormal Lactation (Galactorrhea)] : no galactorrhea [Breast Abnormal Secretion Bloody Fluid] : no bloody discharge [Breast Abnormal Secretion Serous Fluid] : no serous discharge [Breast Abnormal Secretion Opalescent Fluid] : no milky discharge [No Axillary Lymphadenopathy] : no left axillary lymphadenopathy [No Edema] : no edema [No Rashes] : no rashes [No Ulceration] : no ulceration [de-identified] : On exam, the patient has B-cup breasts.  On palpation, she has had some postbiopsy changes towards the upper outer aspect of the right breast but no suspicious masses.  She has no axillary, supraclavicular, or cervical adenopathy. [de-identified] : Postbiopsy changes in the right breast upper outer quadrant

## 2023-01-27 NOTE — ADDENDUM
[FreeTextEntry1] : I spent greater than 75% in consultation in face-to-face counseling and coordination care for this newly diagnosed right breast flat epithelial atypia.

## 2023-01-27 NOTE — HISTORY OF PRESENT ILLNESS
[FreeTextEntry1] : The patient is a 47-year-old G3, P3 female with likely [place]er Ashkenazi Rastafari heritage who underwent menarche at age 11 and had her first child at age 33.  She has a strong family history with her maternal grandmother who had breast cancer in her 50s and possibly had ovarian cancer.  Her paternal grandfather had liver cancer at age 70 and a maternal uncle had prostate cancer at age 70.  The patient's brother had an appendiceal cancer, questionable carcinoid.  The patient was found to have some right breast upper lateral calcifications seen on screening mammography on May 12, 2022.  Diagnostic right breast mammography on December 21, 2022 performed here at [facility] showed some increase in the calcifications in the right breast upper central region and stereotactic biopsy on January 11, 2023 showed some flat epithelial atypia associated with calcifications as well as pseudo angiomatous stromal hyperplasia and a "Top-Hat" clip was placed at that site.  She then had another area of calcifications biopsied in the right breast upper outer quadrant on January 18, 2023 which was benign just showing fibrocystic change and apocrine metaplasia where a "stoplight" clip was placed.  She comes in now for a surgical evaluation.

## 2023-01-27 NOTE — ASSESSMENT
[FreeTextEntry1] : The patient is a 47-year-old G3, P3 female with likely [place] Ashkenazi Amish heritage who underwent menarche at age 11 and had her first child at age 33.  She has a strong family history with her maternal grandmother who had breast cancer in her 50s and possibly had ovarian cancer.  Her paternal grandfather had liver cancer at age 70 and a maternal uncle had prostate cancer at age 70.  The patient's brother had an appendiceal cancer, questionable carcinoid.  The patient was found to have some right breast upper lateral calcifications seen on screening mammography on May 12, 2022.  Diagnostic right breast mammography on December 21, 2022 performed here at [place] [facility] showed some increase in the calcifications in the right breast upper central region and stereotactic biopsy on January 11, 2023 showed some flat epithelial atypia associated with calcifications as well as pseudo angiomatous stromal hyperplasia and a "Top-Hat" clip was placed at that site.  She then had another area of calcifications biopsied in the right breast upper outer quadrant on January 18, 2023 which was benign just showing fibrocystic change and apocrine metaplasia where a "stoplight" clip was placed.  I did calculate out her Ligia risk model and she is a lifetime risk of 36%.  I reviewed her imaging and these calcifications were really fairly faint and not overly suspicious.  On exam she just has postbiopsy changes in the right breast upper outer quadrant.  I spoke to the patient and her  at length regarding this flat epithelial atypia and it does have some increased risk of breast cancer.  Given that it was associated with calcifications I would suggest an excision of this region with a localization clip.  I would like her to get an MRI given her increased risk prior to any surgery and we will get preapproval and follow-up on that result.  If everything looks localized and we will just go ahead and book her for a wide excision with localization clip.  The patient understands the procedure and agrees to proceed as planned.  I did speak to them as well about the slight increased risk of breast cancer and the possible use of tamoxifen for risk reduction.  I can talk to them further after we get the final pathology from the wide excision.

## 2023-02-06 ENCOUNTER — RESULT REVIEW (OUTPATIENT)
Age: 48
End: 2023-02-06

## 2023-02-08 ENCOUNTER — NON-APPOINTMENT (OUTPATIENT)
Age: 48
End: 2023-02-08

## 2023-02-22 ENCOUNTER — APPOINTMENT (OUTPATIENT)
Dept: HEMATOLOGY ONCOLOGY | Facility: CLINIC | Age: 48
End: 2023-02-22

## 2023-02-23 ENCOUNTER — RESULT REVIEW (OUTPATIENT)
Age: 48
End: 2023-02-23

## 2023-02-23 ENCOUNTER — TRANSCRIPTION ENCOUNTER (OUTPATIENT)
Age: 48
End: 2023-02-23

## 2023-02-23 NOTE — DISCUSSION/SUMMARY
[FreeTextEntry1] : The visit was provided via telehealth using real-time 2-way audio visual technology. The patient, Tereza Gross, was located at home in [place], [place], at the time of the visit. The provider, Krysta Rm MS, CGC was located at the medical office in [place], [place] at the time of the visit. Verbal consent for telehealth services was given on 2023 by the patient, Tereza Gross.\par \par REASON FOR CONSULT\par Tereza Gross is a 47-year-old female referred by Dr. Luigi Sheldon for cancer genetic counseling and risk assessment due to a family history of cancer. Ms. Gross was seen on 2023 at which time medical and family history was ascertained and a pedigree constructed. \par \par RELEVANT MEDICAL HISTORY\par Ms. Gross is a healthy individual with no reported history of cancer. 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 She also has a family history of breast, ovarian, prostate, and appendiceal cancer, see below.\par \par OTHER MEDICAL AND SURGICAL HISTORY:\par •	Medical History: ADHD, chronic GERD, dyspepsia, iron deficiency anemia, pituitary adenoma, sinusitis\par •	Surgical History: , tubal ligation, vaginal tearing repair\par \par OB/GYN HISTORY:\par Obstetrical History: \par Age at Menarche: 11\par Menopausal Status: Premenopausal\par Age at First Live Birth: 33\par Oral Contraceptive Use: pill use reported for 10 years\par Hormone Replacement Therapy: None\par \par CANCER SCREENING HISTORY:  \par Breast: \par •	Mammography: annual, most recent screening on 2022, detected right breast upper lateral calcifications, diagnostic imaging on 2022 showed increased calcifications \par •	Sonography: None\par •	MRI: 2023, detected bilateral areas of clumped non-mass-like enhancement in the right and left breast periareolar regions, right breast enhancement more prominent and biopsy recommended\par •	Biopsies: right breast 2023 - detected flat epithelial atypia associated with calcifications and PASH, right breast 2023 – benign\par GYN:\par •	Pelvic Examination: annual, most recent exam reported in 2022\par o	Patient reported no h/o gynecologic concerns\par Colon:\par •	Colonoscopy: 5-year frequency, baseline screening on 2020\par o	Detected one 3 mm cecal polyp\par •	Upper Endoscopy: done on 2020, negative\par Skin:  \par •	FBSE: annual, most recent exam reported one year ago\par •	Lesions biopsied/removed: reported several benign removals and one precancerous removal\par \par SOCIAL HISTORY:\par •	Tobacco-product use: None\par \par FAMILY HISTORY:\par Maternal ancestry was reported as Western  and paternal ancestry was reported as Malagasy, Kiswahili. A detailed family history of cancer was ascertained, see below and scanned chart for pedigree. \par \par To Ms. Gross’s knowledge no one in the family has had germline testing for cancer susceptibility. Ashkenazi Orthodoxy ancestry was confirmed in her paternal family history. Consanguinity was denied. \par 	\par RISK ASSESSMENT:\par Ms. Gross’s family history may be suggestive of a hereditary cancer syndrome given her maternal grandmother’s breast cancer in her late 50s and possible ovarian cancer in her early 50s, as well as her maternal uncle’s prostate cancer at age 70. She also reported limited information regarding her paternal family history given that her father passed away due to an accident at age 34 and paternal Ashkenazi Orthodoxy ancestry. We recommended genetic testing for genes associated with breast and gynecological cancer. This test analyzes 19 genes: LISA, BARD1, BRCA1, BRCA2, BRIP1, CDH1, CHEK2, EPCAM, MLH1, MSH2, MSH6, NF1, PALB2, PMS2, PTEN, RAD51C, RAD51D, STK11, and TP53.\par \par The risks, benefits and limitations of genetic testing were discussed with Ms. Gross. In addition, we discussed the purpose of genetic testing and possible test results (positive, negative, inconclusive) along with associated medical management options and psychosocial implications. Insurance coverage and potential out of pocket costs were also discussed. The Genetic Information Non-discrimination Act (ROB) was reviewed.\par \par It was explained that risk assessment is based upon medical and family history as provided and may change in the future should new information be obtained. \par \par Following our discussion, Ms. Gross verbally consented to the above-mentioned genetic testing panel. A saliva kit will be sent to her home in order to obtain a specimen.\par \par PLAN:\par \par 1.	Saliva sample will be sent to [facility] for analysis. \par 2.	We will contact Ms. Gross to schedule a follow-up appointment once the results are available. Results generally return in 2-3 weeks after the lab receives the specimen. \par \par For any additional questions please call [facility] at [phone]. \par \par \par Krysta Rm MS, [facility] [place] [facility]\par Genetic Counselor, Cancer Genetics\par \par \par CC: Luigi Sheldon MD

## 2023-02-26 ENCOUNTER — FORM ENCOUNTER (OUTPATIENT)
Age: 48
End: 2023-02-26

## 2023-02-28 ENCOUNTER — NON-APPOINTMENT (OUTPATIENT)
Age: 48
End: 2023-02-28

## 2023-03-14 ENCOUNTER — RESULT REVIEW (OUTPATIENT)
Age: 48
End: 2023-03-14

## 2023-03-16 ENCOUNTER — NON-APPOINTMENT (OUTPATIENT)
Age: 48
End: 2023-03-16

## 2023-03-16 NOTE — DISCUSSION/SUMMARY
[FreeTextEntry1] : REASON FOR CONSULT\par Tereza Gross is a 47-year-old female who was contacted 3/16/2023 for a discussion regarding her variant of uncertain significance (VUS) genetic testing results related to hereditary cancer predisposition. This session was conducted via telephone. \par \par Ms. Gross was originally seen at [facility] on 2/22/2023 for hereditary cancer predisposition risk assessment due to a personal history of several benign breast biopsies, with the most recent revealing flat epithelial atypia and pseudo angiomatous stromal hyperplasia. At that time, Ms. 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 Individuals with a pathogenic mutation in NF1 may have an increased risk for clinical symptoms of neurofibromatosis type 1. It is unknown if the patient has an increased risk for the cancers associated with NF1 at this time. \par \par The detection of this VUS does NOT currently change the patient’s medical management. It is NOT recommended at this time that family members use this result for predictive genetic testing or medical management decisions. With more research, a VUS may be reclassified as either disease-causing or benign. Ms. Gross was encouraged to contact us every 2-3 years to enquire about any new information for this variant, or sooner if there are any changes in her personal or family history of cancer.  Such updates could possibly change our risk assessment and recommendations.\par \par We discussed that the cause of the Ms. Gross’s family history of cancer remains unknown and that this result does not rule out a hereditary cancer risk in the patient since it is possible, although unlikely, the patient has a mutation that is not detectable by this analysis or is in an unidentified gene. It is also possible there is a hereditary cancer predisposition in the family, but the patient did not inherit it. \par \par Given Ms. Gross’s current reported family history of cancer, and her genetic test results, the following screening guidelines and risk-reducing recommendations were discussed:\par \par BREAST: \par •	Ms. Gross is planning to have a right breast surgical excision. These genetic test results do not change her surgical plans and Ms. Gross should continue annual breast cancer screening at the discretion of her breast surgeon.\par \par OTHER:\par •	In the absence of other indications, Ms. Gross should practice age-appropriate cancer screening of other organ systems as recommended for the general population.\par \par PLAN:\par 1.	These results do not change Ms. Gross’s medical management. Long-term management and surveillance should be based on the patient’s on- or post-treatment protocol as recommended by her breast surgeon.\par 2.	Testing of family members for this VUS is not recommended. \par 3.	Patient informed consult note(s) will be available through their [facility] patient portal and genetic test results will be released via [facility]’s laboratory portal. \par 4.	The patient was encouraged to contact us every 2-3 years to check on any changes in interpretation of the VUS, or sooner if there are changes in her personal or family history of cancer.\par \par \par For any additional questions please call [facility] at [phone]. \par \par \par Krysta Rm MS, [facility] [place]\par Genetic Counselor, Cancer Genetics\par \par \par CC: Luigi Sheldon MD

## 2023-03-20 ENCOUNTER — NON-APPOINTMENT (OUTPATIENT)
Age: 48
End: 2023-03-20

## 2023-04-19 ENCOUNTER — NON-APPOINTMENT (OUTPATIENT)
Age: 48
End: 2023-04-19

## 2023-04-19 ENCOUNTER — APPOINTMENT (OUTPATIENT)
Dept: INTERNAL MEDICINE | Facility: CLINIC | Age: 48
End: 2023-04-19
Payer: COMMERCIAL

## 2023-04-19 VITALS
HEART RATE: 97 BPM | WEIGHT: 155 LBS | TEMPERATURE: 97.5 F | BODY MASS INDEX: 26.46 KG/M2 | RESPIRATION RATE: 16 BRPM | HEIGHT: 64 IN | SYSTOLIC BLOOD PRESSURE: 118 MMHG | DIASTOLIC BLOOD PRESSURE: 74 MMHG | OXYGEN SATURATION: 98 %

## 2023-04-19 DIAGNOSIS — J45.909 UNSPECIFIED ASTHMA, UNCOMPLICATED: ICD-10-CM

## 2023-04-19 DIAGNOSIS — R94.31 ABNORMAL ELECTROCARDIOGRAM [ECG] [EKG]: ICD-10-CM

## 2023-04-19 DIAGNOSIS — Z01.818 ENCOUNTER FOR OTHER PREPROCEDURAL EXAMINATION: ICD-10-CM

## 2023-04-19 PROCEDURE — 99215 OFFICE O/P EST HI 40 MIN: CPT | Mod: 25

## 2023-04-19 PROCEDURE — 36415 COLL VENOUS BLD VENIPUNCTURE: CPT

## 2023-04-19 PROCEDURE — 93000 ELECTROCARDIOGRAM COMPLETE: CPT

## 2023-04-19 RX ORDER — DIAZEPAM 5 MG/1
5 TABLET ORAL
Qty: 15 | Refills: 0 | Status: DISCONTINUED | COMMUNITY
Start: 2023-01-17 | End: 2023-04-19

## 2023-04-19 RX ORDER — ALBUTEROL SULFATE 90 UG/1
108 (90 BASE) INHALANT RESPIRATORY (INHALATION)
Qty: 1 | Refills: 0 | Status: ACTIVE | COMMUNITY
Start: 2023-04-19 | End: 1900-01-01

## 2023-04-20 ENCOUNTER — TRANSCRIPTION ENCOUNTER (OUTPATIENT)
Age: 48
End: 2023-04-20

## 2023-04-20 PROBLEM — R94.31 SHORTENED PR INTERVAL: Status: ACTIVE | Noted: 2023-04-19

## 2023-04-20 PROBLEM — Z01.818 PREOP EXAM FOR INTERNAL MEDICINE: Status: ACTIVE | Noted: 2023-04-19

## 2023-04-20 LAB
ANION GAP SERPL CALC-SCNC: 14 MMOL/L
BASOPHILS # BLD AUTO: 0.07 K/UL
BASOPHILS NFR BLD AUTO: 1.2 %
BUN SERPL-MCNC: 10 MG/DL
CALCIUM SERPL-MCNC: 9.7 MG/DL
CHLORIDE SERPL-SCNC: 100 MMOL/L
CO2 SERPL-SCNC: 25 MMOL/L
CREAT SERPL-MCNC: 0.65 MG/DL
EGFR: 109 ML/MIN/1.73M2
EOSINOPHIL # BLD AUTO: 0.11 K/UL
EOSINOPHIL NFR BLD AUTO: 1.9 %
GLUCOSE SERPL-MCNC: 101 MG/DL
HCT VFR BLD CALC: 41.7 %
HGB BLD-MCNC: 13.1 G/DL
IMM GRANULOCYTES NFR BLD AUTO: 0.2 %
LYMPHOCYTES # BLD AUTO: 1.99 K/UL
LYMPHOCYTES NFR BLD AUTO: 34.8 %
MAN DIFF?: NORMAL
MCHC RBC-ENTMCNC: 28.9 PG
MCHC RBC-ENTMCNC: 31.4 GM/DL
MCV RBC AUTO: 91.9 FL
MONOCYTES # BLD AUTO: 0.67 K/UL
MONOCYTES NFR BLD AUTO: 11.7 %
NEUTROPHILS # BLD AUTO: 2.87 K/UL
NEUTROPHILS NFR BLD AUTO: 50.2 %
PLATELET # BLD AUTO: 432 K/UL
POTASSIUM SERPL-SCNC: 4.2 MMOL/L
RBC # BLD: 4.54 M/UL
RBC # FLD: 13 %
SODIUM SERPL-SCNC: 139 MMOL/L
WBC # FLD AUTO: 5.72 K/UL

## 2023-04-20 NOTE — PHYSICAL EXAM
[No Acute Distress] : no acute distress [Normal Voice/Communication] : normal voice/communication [Normal Sclera/Conjunctiva] : normal sclera/conjunctiva [No Lymphadenopathy] : no lymphadenopathy [No Edema] : there was no peripheral edema [Normal] : soft, non-tender, non-distended, no masses palpated, no HSM and normal bowel sounds [de-identified] : No sinus tenderness

## 2023-04-20 NOTE — REVIEW OF SYSTEMS
[Fever] : no fever [Chest Pain] : no chest pain [Palpitations] : no palpitations [Shortness Of Breath] : no shortness of breath [Wheezing] : no wheezing [Cough] : no cough [Abdominal Pain] : no abdominal pain [Nausea] : no nausea [Constipation] : no constipation [Diarrhea] : diarrhea [Vomiting] : no vomiting [Heartburn] : no heartburn [Dysuria] : no dysuria [Hematuria] : no hematuria [Frequency] : no frequency [FreeTextEntry7] : No blood in stool

## 2023-04-25 ENCOUNTER — RESULT REVIEW (OUTPATIENT)
Age: 48
End: 2023-04-25

## 2023-04-26 ENCOUNTER — RESULT REVIEW (OUTPATIENT)
Age: 48
End: 2023-04-26

## 2023-04-27 ENCOUNTER — APPOINTMENT (OUTPATIENT)
Dept: BREAST CENTER | Facility: HOSPITAL | Age: 48
End: 2023-04-27

## 2023-04-27 ENCOUNTER — RESULT REVIEW (OUTPATIENT)
Age: 48
End: 2023-04-27

## 2023-04-30 NOTE — REASON FOR VISIT
[Post Op: _________] : a [unfilled] post op visit [FreeTextEntry1] : The patient is a premenopausal white female with a likely Polish Ashkenazi Jainism heritage with a family history of breast and possibly ovarian cancer.  She was found to have some calcifications in the upper central and outer aspect of the right breast and stereotactic core biopsy of the upper central calcifications came back with flat epithelial atypia.  She has an increased Ligia model lifetime risk of 36% and MRI showed some further enhancement in the left breast 3:00 region which was biopsied and benign.  She underwent a right breast wide excision of this flat epithelial atypia with Hologic localization performed on April 27, 2023.  She comes in now for postop follow-up.

## 2023-04-30 NOTE — HISTORY OF PRESENT ILLNESS
[FreeTextEntry1] : The patient is a 47-year-old G3, P3 female with likely Citizen of [place] Ashkenazi Lutheran heritage who underwent menarche at age 11 and had her first child at age 33.  She has a strong family history with her maternal grandmother who had breast cancer in her 50s and possibly had ovarian cancer.  Her paternal grandfather had liver cancer at age 70 and a maternal uncle had prostate cancer at age 70.  The patient's brother had an appendiceal cancer, questionable carcinoid.  The patient was found to have some right breast upper lateral calcifications seen on screening mammography on May 12, 2022.  Diagnostic right breast mammography on December 21, 2022 performed here at [place] showed some increase in the calcifications in the right breast upper central region and stereotactic biopsy on January 11, 2023 showed some flat epithelial atypia associated with calcifications as well as pseudo angiomatous stromal hyperplasia and a "Top-Hat" clip was placed at that site.  She then had another area of calcifications biopsied in the right breast upper outer quadrant on January 18, 2023 which was benign just showing fibrocystic change and apocrine metaplasia where a "stoplight" clip was placed.  She had a Ligia calculated lifetime risk of breast cancer of 36% and underwent a bilateral breast MRI on February 7, 2023 which showed some non-mass-like enhancement in the left 3:00 periareolar region and MRI guided core biopsy performed on March 15, 2023 just showed benign fibrocystic change.  She then underwent a right breast upper outer quadrant partial mastectomy with Hologic localization for this flat epithelial atypia performed on April 27, 2023.  She comes in now for postop follow-up.

## 2023-04-30 NOTE — PAST MEDICAL HISTORY
[Menarche Age ____] : age at menarche was [unfilled] [Menstruating] : The patient is menstruating [Definite ___ (Date)] : the last menstrual period was [unfilled] [Total Preg ___] : G[unfilled] [Live Births ___] : P[unfilled]  [Age At Live Birth ___] : Age at live birth: [unfilled] [History of Hormone Replacement Treatment] : has no history of hormone replacement treatment

## 2023-04-30 NOTE — ASSESSMENT
[FreeTextEntry1] : The patient is a 47-year-old G3, P3 female with likely [place] Ashkenazi Yazidism heritage who underwent menarche at age 11 and had her first child at age 33.  She has a strong family history with her maternal grandmother who had breast cancer in her 50s and possibly had ovarian cancer.  Her paternal grandfather had liver cancer at age 70 and a maternal uncle had prostate cancer at age 70.  The patient's brother had an appendiceal cancer, questionable carcinoid.  The patient was found to have some right breast upper lateral calcifications seen on screening mammography on May 12, 2022.  Diagnostic right breast mammography on December 21, 2022 performed here at [place] showed some increase in the calcifications in the right breast upper central region and stereotactic biopsy on January 11, 2023 showed some flat epithelial atypia associated with calcifications as well as pseudo angiomatous stromal hyperplasia and a "Top-Hat" clip was placed at that site.  She then had another area of calcifications biopsied in the right breast upper outer quadrant on January 18, 2023 which was benign just showing fibrocystic change and apocrine metaplasia where a "stoplight" clip was placed.  I did calculate out her Ligia risk model and she is a lifetime risk of 36%.  I reviewed her imaging and these calcifications were really fairly faint and not overly suspicious. She underwent a bilateral breast MRI on February 7, 2023 which showed some non-mass-like enhancement in the left 3:00 periareolar region and MRI guided core biopsy performed on March 15, 2023 just showed benign fibrocystic change.  She then underwent a right breast upper outer quadrant partial mastectomy with Hologic localization for this flat epithelial atypia performed on April 27, 2023.  The final pathology showed ???????. On exam today, she is healing well from her right breast wide excision for this flat epithelial atypia.  I went over the pathology with the patient and she was given a copy of her pathology for her records.  She understands her increased risk of breast cancer and I did talk to her about tamoxifen for risk reduction.  She has decided to ???????. She understands the need for routine yearly clinical follow-up.  I would like her to reset her routine bilateral mammography and ultrasound to be performed in about 6 months around October 2023.

## 2023-04-30 NOTE — PHYSICAL EXAM
[Normocephalic] : normocephalic [Atraumatic] : atraumatic [EOMI] : extra ocular movement intact [Supple] : supple [No Supraclavicular Adenopathy] : no supraclavicular adenopathy [No Cervical Adenopathy] : no cervical adenopathy [Examined in the supine and seated position] : examined in the supine and seated position [No dominant masses] : no dominant masses in right breast  [No dominant masses] : no dominant masses left breast [No Nipple Retraction] : no left nipple retraction [No Nipple Discharge] : no left nipple discharge [Breast Mass Left Breast ___cm] : no masses [Breast Mass Right Breast ___cm] : no masses [No Axillary Lymphadenopathy] : no left axillary lymphadenopathy [No Edema] : no edema [No Rashes] : no rashes [No Ulceration] : no ulceration [Breast Nipple Inversion] : nipples not inverted [Breast Nipple Retraction] : nipples not retracted [Breast Nipple Flattening] : nipples not flattened [Breast Nipple Fissures] : nipples not fissured [Breast Abnormal Lactation (Galactorrhea)] : no galactorrhea [Breast Abnormal Secretion Bloody Fluid] : no bloody discharge [Breast Abnormal Secretion Serous Fluid] : no serous discharge [Breast Abnormal Secretion Opalescent Fluid] : no milky discharge [de-identified] : On exam, the patient has B-cup breasts.  She is healing well from her right breast wide excision for this flat epithelial atypia.  She has no axillary, supraclavicular, or cervical adenopathy. [de-identified] : Status post right breast wide excision for flat epithelial atypia.  Her wound is healing well.

## 2023-05-03 ENCOUNTER — NON-APPOINTMENT (OUTPATIENT)
Age: 48
End: 2023-05-03

## 2023-05-05 ENCOUNTER — APPOINTMENT (OUTPATIENT)
Dept: BREAST CENTER | Facility: CLINIC | Age: 48
End: 2023-05-05
Payer: COMMERCIAL

## 2023-05-05 VITALS
BODY MASS INDEX: 25.92 KG/M2 | DIASTOLIC BLOOD PRESSURE: 77 MMHG | SYSTOLIC BLOOD PRESSURE: 118 MMHG | OXYGEN SATURATION: 100 % | WEIGHT: 151 LBS | HEART RATE: 68 BPM

## 2023-05-05 DIAGNOSIS — Z12.31 ENCOUNTER FOR SCREENING MAMMOGRAM FOR MALIGNANT NEOPLASM OF BREAST: ICD-10-CM

## 2023-05-05 DIAGNOSIS — Z98.890 OTHER SPECIFIED POSTPROCEDURAL STATES: ICD-10-CM

## 2023-05-05 PROCEDURE — 99024 POSTOP FOLLOW-UP VISIT: CPT

## 2023-05-05 NOTE — ASSESSMENT
[FreeTextEntry1] : The patient is a 47-year-old G3, P3 female with likely [place] Ashkenazi Yazidism heritage who underwent menarche at age 11 and had her first child at age 33.  She has a strong family history with her maternal grandmother who had breast cancer in her 50s and possibly had ovarian cancer.  Her paternal grandfather had liver cancer at age 70 and a maternal uncle had prostate cancer at age 70.  The patient's brother had an appendiceal cancer, questionable carcinoid.  The patient was found to have some right breast upper lateral calcifications seen on screening mammography on May 12, 2022.  Diagnostic right breast mammography on December 21, 2022 performed here at [place] showed some increase in the calcifications in the right breast upper central region and stereotactic biopsy on January 11, 2023 showed some flat epithelial atypia associated with calcifications as well as pseudo angiomatous stromal hyperplasia and a "Top-Hat" clip was placed at that site.  She then had another area of calcifications biopsied in the right breast upper outer quadrant on January 18, 2023 which was benign just showing fibrocystic change and apocrine metaplasia where a "stoplight" clip was placed.  I did calculate out her Ligia risk model and she is a lifetime risk of 36%.  I reviewed her imaging and these calcifications were really fairly faint and not overly suspicious. She underwent a bilateral breast MRI on February 7, 2023 which showed some non-mass-like enhancement in the left 3:00 periareolar region and MRI guided core biopsy performed on March 15, 2023 just showed benign fibrocystic change.  She then underwent a right breast upper outer quadrant partial mastectomy with Hologic localization for this flat epithelial atypia performed on April 27, 2023.  The final pathology showed some further atypical duct hyperplasia but no cancer. On exam today, she is healing well from her right breast wide excision for this ductal atypia.  I went over the pathology with the patient and she was given a copy of her pathology for her records.  She understands her increased risk of breast cancer and I did talk to her about tamoxifen for risk reduction.  She has decided to think about it after explained all the risk/side effects. She understands the need for clinical follow-up and I will see her again in 6 months and then yearly afterwards.  I would like her to reset her routine bilateral mammography and ultrasound to be performed in about 6 months around October 2023 and she was given a prescription and I would like her to get another MRI next year again around March or April 2024.

## 2023-05-05 NOTE — REASON FOR VISIT
[Post Op: _________] : a [unfilled] post op visit [FreeTextEntry1] : The patient is a premenopausal white female with a likely Citizen of [place] Ashkenazi Zoroastrian heritage with a family history of breast and possibly ovarian cancer.  She was found to have some calcifications in the upper central and outer aspect of the right breast and stereotactic core biopsy of the upper central calcifications came back with flat epithelial atypia.  She has an increased Ligia model lifetime risk of 36% and MRI showed some further enhancement in the left breast 3:00 region which was biopsied and benign.  She underwent a right breast wide excision of this flat epithelial atypia with Hologic localization performed on April 27, 2023.  She comes in now for postop follow-up.

## 2023-05-05 NOTE — HISTORY OF PRESENT ILLNESS
[FreeTextEntry1] : The patient is a 47-year-old G3, P3 female with likely [place] Ashkenazi Samaritan heritage who underwent menarche at age 11 and had her first child at age 33.  She has a strong family history with her maternal grandmother who had breast cancer in her 50s and possibly had ovarian cancer.  Her paternal grandfather had liver cancer at age 70 and a maternal uncle had prostate cancer at age 70.  The patient's brother had an appendiceal cancer, questionable carcinoid.  The patient was found to have some right breast upper lateral calcifications seen on screening mammography on May 12, 2022.  Diagnostic right breast mammography on December 21, 2022 performed here at [place] showed some increase in the calcifications in the right breast upper central region and stereotactic biopsy on January 11, 2023 showed some flat epithelial atypia associated with calcifications as well as pseudo angiomatous stromal hyperplasia and a "Top-Hat" clip was placed at that site.  She then had another area of calcifications biopsied in the right breast upper outer quadrant on January 18, 2023 which was benign just showing fibrocystic change and apocrine metaplasia where a "stoplight" clip was placed.  She had a Ligia calculated lifetime risk of breast cancer of 36% and underwent a bilateral breast MRI on February 7, 2023 which showed some non-mass-like enhancement in the left 3:00 periareolar region and MRI guided core biopsy performed on March 15, 2023 just showed benign fibrocystic change.  She then underwent a right breast upper outer quadrant partial mastectomy with Hologic localization for this flat epithelial atypia performed on April 27, 2023.  The final pathology just showed some further atypical duct hyperplasia.  I did talk to her about tamoxifen for risk reduction.  She comes in now for postop follow-up.

## 2023-05-05 NOTE — PHYSICAL EXAM
[Normocephalic] : normocephalic [Atraumatic] : atraumatic [EOMI] : extra ocular movement intact [Supple] : supple [No Supraclavicular Adenopathy] : no supraclavicular adenopathy [No Cervical Adenopathy] : no cervical adenopathy [Examined in the supine and seated position] : examined in the supine and seated position [No dominant masses] : no dominant masses in right breast  [No dominant masses] : no dominant masses left breast [No Nipple Retraction] : no left nipple retraction [No Nipple Discharge] : no left nipple discharge [Breast Mass Right Breast ___cm] : no masses [Breast Mass Left Breast ___cm] : no masses [No Axillary Lymphadenopathy] : no left axillary lymphadenopathy [No Edema] : no edema [No Rashes] : no rashes [No Ulceration] : no ulceration [Breast Nipple Inversion] : nipples not inverted [Breast Nipple Retraction] : nipples not retracted [Breast Nipple Flattening] : nipples not flattened [Breast Nipple Fissures] : nipples not fissured [Breast Abnormal Lactation (Galactorrhea)] : no galactorrhea [Breast Abnormal Secretion Bloody Fluid] : no bloody discharge [Breast Abnormal Secretion Serous Fluid] : no serous discharge [Breast Abnormal Secretion Opalescent Fluid] : no milky discharge [de-identified] : On exam, the patient has B-cup breasts.  She is healing well from her right breast wide excision for this flat epithelial atypia.  She has no axillary, supraclavicular, or cervical adenopathy. [de-identified] : Status post right breast wide excision for flat epithelial atypia.  Her wound is healing well.

## 2023-05-30 ENCOUNTER — NON-APPOINTMENT (OUTPATIENT)
Age: 48
End: 2023-05-30

## 2023-06-01 ENCOUNTER — APPOINTMENT (OUTPATIENT)
Dept: BREAST CENTER | Facility: CLINIC | Age: 48
End: 2023-06-01
Payer: COMMERCIAL

## 2023-06-01 VITALS
HEART RATE: 83 BPM | DIASTOLIC BLOOD PRESSURE: 74 MMHG | WEIGHT: 155 LBS | SYSTOLIC BLOOD PRESSURE: 117 MMHG | BODY MASS INDEX: 26.61 KG/M2 | OXYGEN SATURATION: 99 %

## 2023-06-01 DIAGNOSIS — Z90.10 ACQUIRED ABSENCE OF UNSPECIFIED BREAST AND NIPPLE: ICD-10-CM

## 2023-06-01 PROCEDURE — 99024 POSTOP FOLLOW-UP VISIT: CPT

## 2023-06-01 NOTE — ASSESSMENT
[FreeTextEntry1] : The patient is a 47-year-old G3, P3 female with likely Sudanese Ashkenazi Sabianism heritage who underwent menarche at age 11 and had her first child at age 33.  She has a strong family history with her maternal grandmother who had breast cancer in her 50s and possibly had ovarian cancer.  Her paternal grandfather had liver cancer at age 70 and a maternal uncle had prostate cancer at age 70.  The patient's brother had an appendiceal cancer, questionable carcinoid.  The patient was found to have some right breast upper lateral calcifications seen on screening mammography on May 12, 2022.  Diagnostic right breast mammography on December 21, 2022 performed here at [facility] showed some increase in the calcifications in the right breast upper central region and stereotactic biopsy on January 11, 2023 showed some flat epithelial atypia associated with calcifications as well as pseudo angiomatous stromal hyperplasia and a "Top-Hat" clip was placed at that site.  She then had another area of calcifications biopsied in the right breast upper outer quadrant on January 18, 2023 which was benign just showing fibrocystic change and apocrine metaplasia where a "stoplight" clip was placed.  I did calculate out her Ligia risk model and she is a lifetime risk of 36%.  I reviewed her imaging and these calcifications were really fairly faint and not overly suspicious. She underwent a bilateral breast MRI on February 7, 2023 which showed some non-mass-like enhancement in the left 3:00 periareolar region and MRI guided core biopsy performed on March 15, 2023 just showed benign fibrocystic change.  She then underwent a right breast upper outer quadrant partial mastectomy with Hologic localization for this flat epithelial atypia performed on April 27, 2023.  The final pathology showed some further atypical duct hyperplasia but no cancer.  She understood her increased risk of breast cancer with atypia and I did talk to her about tamoxifen for risk reduction.  She researched the possible benefits of endocrine therapy and has decided against it. She understands the need for clinical follow-up and I will see her again in 6 months and then yearly afterwards. On exam today, she is healing well from her right breast wide excision for this ductal atypia but did develop some spitting sutures which she cut out and she currently just has some redness in the wound which should resolve spontaneously.  She was told that she could use silicone strips to reduce scarring. I would like her to reset her routine bilateral mammography and ultrasound to be performed in about 6 months around October 2023 and she was given a prescription and I would like her to get another MRI next year again around March or April 2024.

## 2023-06-01 NOTE — PHYSICAL EXAM
[Normocephalic] : normocephalic [Atraumatic] : atraumatic [EOMI] : extra ocular movement intact [Supple] : supple [No Supraclavicular Adenopathy] : no supraclavicular adenopathy [No Cervical Adenopathy] : no cervical adenopathy [Examined in the supine and seated position] : examined in the supine and seated position [No dominant masses] : no dominant masses in right breast  [No dominant masses] : no dominant masses left breast [No Nipple Retraction] : no left nipple retraction [No Nipple Discharge] : no left nipple discharge [Breast Mass Left Breast ___cm] : no masses [Breast Mass Right Breast ___cm] : no masses [No Axillary Lymphadenopathy] : no left axillary lymphadenopathy [No Edema] : no edema [No Ulceration] : no ulceration [No Rashes] : no rashes [Breast Nipple Inversion] : nipples not inverted [Breast Nipple Retraction] : nipples not retracted [Breast Nipple Flattening] : nipples not flattened [Breast Nipple Fissures] : nipples not fissured [Breast Abnormal Lactation (Galactorrhea)] : no galactorrhea [Breast Abnormal Secretion Bloody Fluid] : no bloody discharge [Breast Abnormal Secretion Serous Fluid] : no serous discharge [Breast Abnormal Secretion Opalescent Fluid] : no milky discharge [de-identified] : On exam, the patient has B-cup breasts.  She is healing well from her right breast wide excision for this flat epithelial atypia but did develop some spitting sutures which she removed at home and now just has some redness and some inflammation in the wound.  She was reassured that this should just resolve spontaneously.  She has no axillary, supraclavicular, or cervical adenopathy. [de-identified] : Status post right breast wide excision for flat epithelial atypia.  She did develop some spitting sutures in the wound which she removed but she did develop some inflammation in the wound which should just resolve spontaneously.

## 2023-06-01 NOTE — HISTORY OF PRESENT ILLNESS
[FreeTextEntry1] : The patient is a 47-year-old G3, P3 female with likely Ugandan Ashkenazi Catholic heritage who underwent menarche at age 11 and had her first child at age 33.  She has a strong family history with her maternal grandmother who had breast cancer in her 50s and possibly had ovarian cancer.  Her paternal grandfather had liver cancer at age 70 and a maternal uncle had prostate cancer at age 70.  The patient's brother had an appendiceal cancer, questionable carcinoid.  The patient was found to have some right breast upper lateral calcifications seen on screening mammography on May 12, 2022.  Diagnostic right breast mammography on December 21, 2022 performed here at [facility] showed some increase in the calcifications in the right breast upper central region and stereotactic biopsy on January 11, 2023 showed some flat epithelial atypia associated with calcifications as well as pseudo angiomatous stromal hyperplasia and a "Top-Hat" clip was placed at that site.  She then had another area of calcifications biopsied in the right breast upper outer quadrant on January 18, 2023 which was benign just showing fibrocystic change and apocrine metaplasia where a "stoplight" clip was placed.  She had a Ligia calculated lifetime risk of breast cancer of 36% and underwent a bilateral breast MRI on February 7, 2023 which showed some non-mass-like enhancement in the left 3:00 periareolar region and MRI guided core biopsy performed on March 15, 2023 just showed benign fibrocystic change.  She then underwent a right breast upper outer quadrant partial mastectomy with Hologic localization for this flat epithelial atypia performed on April 27, 2023.  The final pathology just showed some further atypical duct hyperplasia.  I did talk to her about tamoxifen for risk reduction.  She developed some spitting sutures and comes in now for an interval follow-up postoperatively.

## 2023-06-01 NOTE — REASON FOR VISIT
[Post Op: _________] : a [unfilled] post op visit [FreeTextEntry1] : The patient is a premenopausal white female with a likely Kitt[place] Ashkenazi Hoahaoism heritage with a family history of breast and possibly ovarian cancer.  She was found to have some calcifications in the upper central and outer aspect of the right breast and stereotactic core biopsy of the upper central calcifications came back with flat epithelial atypia.  She has an increased Ligia model lifetime risk of 36% and MRI showed some further enhancement in the left breast 3:00 region which was biopsied and benign.  She underwent a right breast wide excision of this flat epithelial atypia with Hologic localization performed on April 27, 2023.  She comes in now for postop follow-up.

## 2023-07-21 ENCOUNTER — APPOINTMENT (OUTPATIENT)
Dept: INTERNAL MEDICINE | Facility: CLINIC | Age: 48
End: 2023-07-21
Payer: COMMERCIAL

## 2023-07-21 VITALS
HEART RATE: 72 BPM | WEIGHT: 155 LBS | BODY MASS INDEX: 26.46 KG/M2 | OXYGEN SATURATION: 100 % | HEIGHT: 64 IN | TEMPERATURE: 97.2 F | DIASTOLIC BLOOD PRESSURE: 70 MMHG | SYSTOLIC BLOOD PRESSURE: 120 MMHG

## 2023-07-21 DIAGNOSIS — D17.9 BENIGN LIPOMATOUS NEOPLASM, UNSPECIFIED: ICD-10-CM

## 2023-07-21 PROCEDURE — 99214 OFFICE O/P EST MOD 30 MIN: CPT

## 2023-07-21 RX ORDER — FAMOTIDINE 20 MG/1
20 TABLET, FILM COATED ORAL TWICE DAILY
Qty: 120 | Refills: 3 | Status: ACTIVE | COMMUNITY
Start: 2022-04-08 | End: 1900-01-01

## 2023-07-25 ENCOUNTER — APPOINTMENT (OUTPATIENT)
Dept: SURGERY | Facility: CLINIC | Age: 48
End: 2023-07-25

## 2023-07-25 NOTE — HISTORY OF PRESENT ILLNESS
[FreeTextEntry1] : Patient would like to update me on her breast situation she also like a referral for general surgeon to remove a lipoma [de-identified] : Referral for a general surgeon to remove the lipoma.  She would also like to update me on her breast issues status post biopsy and possibility of taking tamoxifen.\par \par 2. cervical spine pain , requesting muscle relaxers \par \par 3. requesting Pepcid for gerd

## 2023-07-27 ENCOUNTER — APPOINTMENT (OUTPATIENT)
Dept: BREAST CENTER | Facility: HOSPITAL | Age: 48
End: 2023-07-27

## 2023-08-07 ENCOUNTER — RESULT REVIEW (OUTPATIENT)
Age: 48
End: 2023-08-07

## 2023-08-07 ENCOUNTER — APPOINTMENT (OUTPATIENT)
Dept: SURGERY | Facility: CLINIC | Age: 48
End: 2023-08-07
Payer: COMMERCIAL

## 2023-08-07 VITALS
BODY MASS INDEX: 26.46 KG/M2 | WEIGHT: 155 LBS | DIASTOLIC BLOOD PRESSURE: 84 MMHG | HEIGHT: 64 IN | TEMPERATURE: 98.1 F | SYSTOLIC BLOOD PRESSURE: 133 MMHG | HEART RATE: 69 BPM

## 2023-08-07 PROCEDURE — 99203 OFFICE O/P NEW LOW 30 MIN: CPT | Mod: 25

## 2023-08-07 PROCEDURE — 21552 EXC NECK LES SC 3 CM/>: CPT

## 2023-08-07 RX ORDER — ONDANSETRON 4 MG/1
4 TABLET ORAL
Qty: 6 | Refills: 0 | Status: DISCONTINUED | COMMUNITY
Start: 2023-04-27 | End: 2023-08-07

## 2023-08-07 NOTE — PHYSICAL EXAM
[Respiratory Effort] : normal respiratory effort [Normal Rate and Rhythm] : normal rate and rhythm [No Rash or Lesion] : No rash or lesion [Alert] : alert [Calm] : calm [de-identified] : NAD, well-appearing [de-identified] : anicteric [de-identified] : ~ 5cm mobile mass in the lower posterior neck on the left; no overlying skin changes [de-identified] : soft, nondistended

## 2023-08-07 NOTE — HISTORY OF PRESENT ILLNESS
[de-identified] : 49 yo F with symptomatic and enlarging posterior neck mass. She has had it for several years and thinks it is slowly enlarging. It is causing some neck pain as well. She is interested in removal.

## 2023-08-07 NOTE — CONSULT LETTER
[Dear  ___] : Dear  [unfilled], [( Thank you for referring [unfilled] for consultation for _____ )] : Thank you for referring [unfilled] for consultation for [unfilled] [Please see my note below.] : Please see my note below. [Consult Closing:] : Thank you very much for allowing me to participate in the care of this patient.  If you have any questions, please do not hesitate to contact me. [Sincerely,] : Sincerely, [FreeTextEntry3] : Parisa De Paz MD

## 2023-08-07 NOTE — ASSESSMENT
[FreeTextEntry1] : 47 yo F with symptomatic and enlarging neck mass, likely lipoma.  Discussed excision in the office vs OR. Patient would like to proceed with office excision. Wound care instructions provided. Follow up in ~ 10 days for wound check.  After informed consent was signed by the patient and witnessed by the medical assistant, the area was prepped with Betadine 20cc of 1% lidocaine with epinephrine were used to anesthetize the skin and subcutaneous tissue. A 15 blade was then used to incise over the mass. Dissection was carried out through the dermis sharply and the 6x4cm lipomatous mass was dissected out using a hemostat and Metzenbaum scissors. Once the specimen was freed, it was sent labeled to pathology in [place]. The wound bed was inspected for hemostasis and controlled with cautery. Once hemostasis was adequate, the wound was closed in 2 layers with 3-0 and 4-0 Vicryl suture. The wound was dressed with steristrips, gauze and tegaderm. The patient tolerated the procedure well.

## 2023-08-16 ENCOUNTER — APPOINTMENT (OUTPATIENT)
Dept: SURGERY | Facility: CLINIC | Age: 48
End: 2023-08-16
Payer: COMMERCIAL

## 2023-08-16 VITALS
HEIGHT: 64 IN | DIASTOLIC BLOOD PRESSURE: 80 MMHG | HEART RATE: 64 BPM | SYSTOLIC BLOOD PRESSURE: 130 MMHG | BODY MASS INDEX: 26.46 KG/M2 | WEIGHT: 155 LBS

## 2023-08-16 DIAGNOSIS — D17.0 BENIGN LIPOMATOUS NEOPLASM OF SKIN AND SUBCUTANEOUS TISSUE OF HEAD, FACE AND NECK: ICD-10-CM

## 2023-08-16 PROCEDURE — 99024 POSTOP FOLLOW-UP VISIT: CPT

## 2023-08-16 NOTE — ASSESSMENT
[FreeTextEntry1] : 49 yo F s/p excision of neck mass. Doing well without complaints.  Return to normal activity Follow up as needed

## 2023-08-16 NOTE — PHYSICAL EXAM
Addendum  =====------=====------=====------=====------=====------=====------=====------=====------=====------=====------=====------=====------=====------=====------=====------=====------=====------=====------=====------    I have reviewed and edited the visit summary above and attest that it is accurate.    With the below changes      Date of Visit:  4/21/2023  Patient Name:  Cameron Ryan  Medical Record Number:  [id number]  YOB: 1951    Objective:  Vital Most Recent Value   Temperature 97.8 °F (36.6 °C)   Pulse 72   Respiratory 20   Blood Pressure 136/74   Pulse Oximetry    O2      Vital Most Recent Value   Weight 114.3 kg (252 lb)   Height 6' 1\" (185.4 cm)     BP Readings from Last 3 Encounters:   04/21/23 136/74   04/11/23 132/80   04/04/23 134/58     Wt Readings from Last 3 Encounters:   04/21/23 114.3 kg (252 lb)   04/11/23 114.7 kg (252 lb 13.9 oz)   04/04/23 113.4 kg (250 lb)       Most Recent Labs:  No visits with results within 1 Week(s) from this visit.   Latest known visit with results is:   Lab Services on 04/12/2023   Component Date Value Ref Range Status   • TSH 04/12/2023 3.562  0.350 - 5.000 mcUnits/mL Final   • Testosterone, Free Male 04/12/2023 32.3 (L)  47.0 - 244.0 pg/mL Final   • Testosterone, Total Male 04/12/2023 312.9  300.0 - 720.0 ng/dL Final       Lab Review:  Lab Results   Component Value Date    HGBA1C 7.1 (H) 03/20/2023    SODIUM 140 04/04/2023    POTASSIUM 4.2 04/04/2023    CHLORIDE 106 04/04/2023    CO2 30 04/04/2023    BUN 13 04/04/2023    CREATININE 0.78 04/04/2023    WBC 10.3 04/04/2023    HGB 13.8 04/04/2023    HCT 42.2 04/04/2023     04/04/2023    TSH 3.562 04/12/2023    CHOLESTEROL 73 03/21/2023    HDL 34 (L) 03/21/2023    CHOHDL 2.1 03/21/2023    TRIGLYCERIDE 96 03/21/2023    CALCLDL 20 03/21/2023    INR 1.1 03/20/2023    URMIC 319.00 07/22/2019    .00 03/30/2023    MALBCR 7,647.1 (H) 08/17/2021    PSA 1.06 06/28/2022    PSA 0.93 08/17/2021     No  results found for: 5GLY    Assessment & Plan:    Cameron was seen today for establish care.    Diagnoses and all orders for this visit:    Lower extremity edema  -     CBC with Automated Differential; Future  -     Comprehensive Metabolic Panel; Future    Type 2 diabetes mellitus without complication, without long-term current use of insulin (CMD)  -     empagliflozin (JARDIANCE) 10 MG tablet; Take 1 tablet by mouth daily (before breakfast).    Other orders  -     potassium CHLORIDE (KLOR-CON M) 20 MEQ rae ER tablet; Take 1 tablet by mouth daily.         Preventative   - colonoscopy 3/12/2019, Dr. Pardo, repeat colonoscopy 10 years (due 2029)    Recent Covid infection  - recovered    Lower Extremity Edema  4/4/2023 US Duplex b/l LE Venous  IMPRESSION:  Negative for DVT,  bilateral lower extremity.  ======================================================================  L>R post L hip replacement  - nephrotic syndrome  - amlodipine held and hydralazine held, continue this  - Starting jardiance 10mg daily, increase to 25mg daily in a month if tolerated  - increase lasix to 80mg BID for 7 days, add KCl 20meq  - elevate legs  - compress with ACE wraps  - fluid restriction 1500cc or 64 fl oz daily  F/u next wednesday    CAD Risk  3/21/2023 ECHO:  Normal left ventricular systolic function. EF 60%.  Normal left ventricular chamber size. Mild septal thickening.  Normal diastolic function.  Normal right ventricular systolic function.  Unremarkable valvular structures.  Incomplete TR spectral Doppler envelope precludes accurate assessment of PASP.  4/7/2023 Nuc Med stress test  IMPRESSION:  1. Myocardial perfusion scan demonstrates large fixed defect of mild  intensity in inferior wall probably due to attenuation. There is no  reversible myocardial stress-induced ischemia.  2. Normal post-regadenoson LV systolic function.  3. Cardiac Risk Assessment: Low.   4. No previous study for  comparison  ======================================================================  - basa  - lisinopril 40mg daily  - metoprolol XL 50mg daily  - atorvastatin 20mg daily    DM  3/20/2023 A1c 7.1  - metformin XR 100mg BID  - glipizide 5mg BID  - adding jardiance 10mg daily, probably need to stop the glipizide in the future when we increase to 25mg daily of the jardiance if cost afforded    Nephrotic syndrome  - follows with nephrology    Hypertension  - follows with nephrology, Dr. Sanchez  - lasix 40mg in the AM and 20mg in the afternoon --> increase to lasix 80mg BID for 7 days  - lisinopril 40mg daily  - metoprolol XL 50mg daily    Stopped 4/12/2023  - Amlodipine 10mg daily  - Hydralazine 25mg TID    Hyperlipidemia  The ASCVD Risk score (Lawanda DK, et al., 2019) failed to calculate for the following reasons:    The valid total cholesterol range is 130 to 320 mg/dL  - atorvastatin 20mg daily    PAD  - trental 400mg CR tab BID  - atorvastatin 20mg daily    Low testosterone  4/12/2023 Testosterone Free 32.4  - will ultimately refer to Endo    BPH  5/25/2022 PSA 1.06    Nephrolithiasis   3/20/2023 CT chest PE imaging  IMPRESSION:  [...] 4.  5 mm left nonobstructing renal calculus.    Asthma  - albuterol prn    Obesity   RAO  - weight loss    Body mass index is 33.25 kg/m².    DO Sherry Anthony Physician:  [facility]  [place] [address]     [Respiratory Effort] : normal respiratory effort [Normal Rate and Rhythm] : normal rate and rhythm [No Rash or Lesion] : No rash or lesion [Calm] : calm [de-identified] : NAD, well-appearing [de-identified] : well healed neck/upper back incision c/d/i

## 2023-08-16 NOTE — HISTORY OF PRESENT ILLNESS
[de-identified] : Patient returns s/p excision of neck lipoma. She is doing well and reports the pinched nerve feeling she was having before surgery is resolved. She has mild soreness from the incision that is resolving. The incision is healing very well.

## 2023-09-29 ENCOUNTER — APPOINTMENT (OUTPATIENT)
Dept: GASTROENTEROLOGY | Facility: CLINIC | Age: 48
End: 2023-09-29
Payer: COMMERCIAL

## 2023-09-29 VITALS
BODY MASS INDEX: 26.46 KG/M2 | DIASTOLIC BLOOD PRESSURE: 70 MMHG | SYSTOLIC BLOOD PRESSURE: 120 MMHG | WEIGHT: 155 LBS | HEIGHT: 64 IN

## 2023-09-29 DIAGNOSIS — R68.81 EARLY SATIETY: ICD-10-CM

## 2023-09-29 PROCEDURE — 99214 OFFICE O/P EST MOD 30 MIN: CPT

## 2023-11-09 ENCOUNTER — RESULT REVIEW (OUTPATIENT)
Age: 48
End: 2023-11-09

## 2023-11-15 ENCOUNTER — NON-APPOINTMENT (OUTPATIENT)
Age: 48
End: 2023-11-15

## 2023-11-27 ENCOUNTER — TRANSCRIPTION ENCOUNTER (OUTPATIENT)
Age: 48
End: 2023-11-27

## 2023-11-28 ENCOUNTER — APPOINTMENT (OUTPATIENT)
Dept: BREAST CENTER | Facility: CLINIC | Age: 48
End: 2023-11-28
Payer: COMMERCIAL

## 2023-11-28 VITALS
SYSTOLIC BLOOD PRESSURE: 139 MMHG | HEIGHT: 64 IN | HEART RATE: 87 BPM | DIASTOLIC BLOOD PRESSURE: 80 MMHG | OXYGEN SATURATION: 99 % | WEIGHT: 155 LBS | BODY MASS INDEX: 26.46 KG/M2

## 2023-11-28 DIAGNOSIS — Z80.41 FAMILY HISTORY OF MALIGNANT NEOPLASM OF OVARY: ICD-10-CM

## 2023-11-28 DIAGNOSIS — Z91.89 OTHER SPECIFIED PERSONAL RISK FACTORS, NOT ELSEWHERE CLASSIFIED: ICD-10-CM

## 2023-11-28 PROCEDURE — 99213 OFFICE O/P EST LOW 20 MIN: CPT

## 2023-12-08 ENCOUNTER — NON-APPOINTMENT (OUTPATIENT)
Age: 48
End: 2023-12-08

## 2023-12-21 ENCOUNTER — NON-APPOINTMENT (OUTPATIENT)
Age: 48
End: 2023-12-21

## 2024-04-04 ENCOUNTER — APPOINTMENT (OUTPATIENT)
Dept: PAIN MANAGEMENT | Facility: CLINIC | Age: 49
End: 2024-04-04
Payer: COMMERCIAL

## 2024-04-04 VITALS
SYSTOLIC BLOOD PRESSURE: 122 MMHG | DIASTOLIC BLOOD PRESSURE: 80 MMHG | BODY MASS INDEX: 26.46 KG/M2 | HEIGHT: 64 IN | WEIGHT: 155 LBS

## 2024-04-04 DIAGNOSIS — M54.2 CERVICALGIA: ICD-10-CM

## 2024-04-04 DIAGNOSIS — M54.16 RADICULOPATHY, LUMBAR REGION: ICD-10-CM

## 2024-04-04 DIAGNOSIS — M79.10 MYALGIA, UNSPECIFIED SITE: ICD-10-CM

## 2024-04-04 PROCEDURE — 99214 OFFICE O/P EST MOD 30 MIN: CPT

## 2024-04-04 RX ORDER — CYCLOBENZAPRINE HYDROCHLORIDE 5 MG/1
5 TABLET, FILM COATED ORAL 3 TIMES DAILY
Qty: 21 | Refills: 3 | Status: ACTIVE | COMMUNITY
Start: 2023-07-21 | End: 1900-01-01

## 2024-04-04 NOTE — HISTORY OF PRESENT ILLNESS
[FreeTextEntry1] : Interval History: Patient returns for follow-up. She underwent GIOVANNY in November of 2021 with excellent results but has new pain. Her pain is currently on the right low back and buttock area. Quality of life is impaired. There has been a severe exacerbation of the patient's chronic pain.  HPI: 46 yof presents w/ low back pain that radiates down the left lower extremity. Pain began after doing HIT workouts and running more. Quality of life is impaired. There has been a severe exacerbation of the patient's chronic pain. She works as a psychologist. Cannot lift due to pain.   Interventions: Left L4-L5 TFESI (11/10/21): [___ yrs] : [unfilled] year(s) ago [Paroxysmal] : paroxysmal [5] : an average pain level of 5/10 [1] : a minimum pain level of 1/10 [10] : a maximum pain level of 10/10 [Sharp] : sharp [Dull] : dull [Aching] : aching [Throbbing] : throbbing [Burning] : burning [Shooting] : shooting [Electric] : electric [Sitting] : sitting [Medications] : medications [Heat] : heat [FreeTextEntry7] : No referral.   Lower back bilateral legs mostly left side . [FreeTextEntry3] : exercise [FreeTextEntry4] : exercise [4] : 3. What number best describes how, during the past week, pain has interfered with your general activity? 4/10 pain [FreeTextEntry2] : 12

## 2024-04-04 NOTE — DATA REVIEWED
[FreeTextEntry1] : 06/28/21  MRI LUMBAR SPINE   FINDINGS AT MULTIPLE LEVELS: There is disc degeneration at L1-L2, L4-L5, L5-S1 with loss of normal high signal in the intervertebral discs on the sagittal T2-weighted sequence.   FINDINGS AT SPECIFIC LEVELS:       L5-S1: No bulge or herniation.       L4-L5: Mild facet osteoarthritis. Mild disc bulge with associated left-sided annular fissure.       L3-L4: No bulge or herniation.       L2-L3: No bulge or herniation.       L1-L2: Minimal disc thinning. Mild disc bulging.       T12-L1: No bulge or herniation.   INTRADURAL SPACE AND CONUS MEDULLARIS: No lesion demonstrated.   BONE MARROW SIGNAL: Subchondral degenerative changes L5-S1.   ALIGNMENT: No change in mild thoracolumbar levoscoliosis.   PARASPINAL SOFT TISSUES: There is no paraspinal mass. There are small bilateral adnexal cysts. In a premenopausal patient, the findings are consistent with physiologic polyp.     IMPRESSION: Mild disc bulging. No disc herniation or spinal stenosis.

## 2024-04-04 NOTE — PHYSICAL EXAM

## 2024-04-04 NOTE — ASSESSMENT
[FreeTextEntry1] : 48 yof w/ low back pain that radiates down the left leg now s/p GIOVANNY with complete relief of pain in 2021 but pain is returning.  I have personally reviewed the patient's MRI in detail and discussed it with them which is significant for disc bulge and annular fissure at L4-L5. This is from 2021.  The patient has failed to have relief with over six weeks of physical therapy within the last three months and all medications. GIven their failure to improve with all other conservative measures recommend MRI lumbar spine. Patient will return to review imaging and plan for potential intervention.  Physical therapy prescribed - goal will be to increase ROM, strengthening, postural training, other modalities ad vesta which may include massage and stim. Goals of therapy discussed with the patient in detail and will be discussed with physical therapist. Patient will follow-up following course of physical therapy to monitor progress and adjust therapy as needed.  Acetaminophen 1,000 mg q8h prn for moderate pain. Risks, benefits, and alternatives of acetaminophen discussed with patient.  Ibuprofen 600 mg q8h prn add when pain is not adequately controlled with acetaminophen. Risks, benefits, and alternatives of ibuprofen discussed with patient.  Diet and nutritional strategies discussed which may improve patients pain and will improve overall health.  Cyclobenzaprine for muscular pain prn.

## 2024-04-18 ENCOUNTER — RESULT REVIEW (OUTPATIENT)
Age: 49
End: 2024-04-18

## 2024-04-22 ENCOUNTER — NON-APPOINTMENT (OUTPATIENT)
Age: 49
End: 2024-04-22

## 2024-05-01 ENCOUNTER — RESULT REVIEW (OUTPATIENT)
Age: 49
End: 2024-05-01

## 2024-05-03 RX ORDER — PANTOPRAZOLE 20 MG/1
20 TABLET, DELAYED RELEASE ORAL TWICE DAILY
Qty: 120 | Refills: 3 | Status: ACTIVE | COMMUNITY
Start: 2024-05-03 | End: 1900-01-01

## 2024-05-08 ENCOUNTER — NON-APPOINTMENT (OUTPATIENT)
Age: 49
End: 2024-05-08

## 2024-05-11 ENCOUNTER — RESULT REVIEW (OUTPATIENT)
Age: 49
End: 2024-05-11

## 2024-05-13 ENCOUNTER — APPOINTMENT (OUTPATIENT)
Dept: BREAST CENTER | Facility: CLINIC | Age: 49
End: 2024-05-13
Payer: COMMERCIAL

## 2024-05-13 VITALS
BODY MASS INDEX: 25.61 KG/M2 | HEIGHT: 64 IN | WEIGHT: 150 LBS | OXYGEN SATURATION: 99 % | HEART RATE: 69 BPM | DIASTOLIC BLOOD PRESSURE: 84 MMHG | SYSTOLIC BLOOD PRESSURE: 139 MMHG

## 2024-05-13 DIAGNOSIS — R92.8 OTHER ABNORMAL AND INCONCLUSIVE FINDINGS ON DIAGNOSTIC IMAGING OF BREAST: ICD-10-CM

## 2024-05-13 DIAGNOSIS — R92.30 DENSE BREASTS, UNSPECIFIED: ICD-10-CM

## 2024-05-13 DIAGNOSIS — N60.91 UNSPECIFIED BENIGN MAMMARY DYSPLASIA OF RIGHT BREAST: ICD-10-CM

## 2024-05-13 DIAGNOSIS — N60.81 OTHER BENIGN MAMMARY DYSPLASIAS OF RIGHT BREAST: ICD-10-CM

## 2024-05-13 DIAGNOSIS — Z80.3 FAMILY HISTORY OF MALIGNANT NEOPLASM OF BREAST: ICD-10-CM

## 2024-05-13 PROCEDURE — 99213 OFFICE O/P EST LOW 20 MIN: CPT

## 2024-05-13 NOTE — REASON FOR VISIT
[Follow-Up: _____] : a [unfilled] follow-up visit [FreeTextEntry1] : The patient is a premenopausal white female with a likely [place] Ashkenazi Taoism heritage with a family history of breast and possibly ovarian cancer.  She was found to have some calcifications in the upper central and outer aspect of the right breast and stereotactic core biopsy of the upper central calcifications came back with flat epithelial atypia.  She has an increased Ligia model lifetime risk of 36% and MRI showed some further enhancement in the left breast 3:00 region which was biopsied and benign.  She underwent a right breast wide excision of this flat epithelial atypia with Hologic localization performed on April 27, 2023.  The final pathology just showed some further atypical duct hyperplasia and she refused tamoxifen for risk reduction.  She comes in now for routine follow-up.

## 2024-05-13 NOTE — HISTORY OF PRESENT ILLNESS
[FreeTextEntry1] : The patient is a 48-year-old G3, P3 female with likely Faroese Ashkenazi Orthodoxy heritage who underwent menarche at age 11 and had her first child at age 33.  She has a strong family history with her maternal grandmother who had breast cancer in her 50s and possibly had ovarian cancer.  Her paternal grandfather had liver cancer at age 70 and a maternal uncle had prostate cancer at age 70.  The patient's brother had an appendiceal cancer, questionable carcinoid.  The patient was found to have some right breast upper lateral calcifications seen on screening mammography on May 12, 2022.  Diagnostic right breast mammography on December 21, 2022 performed here at [facility] showed some increase in the calcifications in the right breast upper central region and stereotactic biopsy on January 11, 2023 showed some flat epithelial atypia associated with calcifications as well as pseudo angiomatous stromal hyperplasia and a "Top-Hat" clip was placed at that site.  She then had another area of calcifications biopsied in the right breast upper outer quadrant on January 18, 2023 which was benign just showing fibrocystic change and apocrine metaplasia where a "stoplight" clip was placed.  She had a Ligia calculated lifetime risk of breast cancer of 36% and underwent a bilateral breast MRI on February 7, 2023 which showed some non-mass-like enhancement in the left 3:00 periareolar region and MRI guided core biopsy performed on March 15, 2023 just showed benign fibrocystic change.  She then underwent a right breast upper outer quadrant partial mastectomy with Hologic localization for this flat epithelial atypia performed on April 27, 2023.  The final pathology just showed some further atypical duct hyperplasia.  I did talk to her about tamoxifen for risk reduction.  She underwent another right breast UOQ MRI guided core biopsy on May 2, 2024 due to another area of suspicious non-mass like enhancement and this showed multifocal ALH and she comes in now for an interval follow-up.

## 2024-05-13 NOTE — PHYSICAL EXAM
[Normocephalic] : normocephalic [Atraumatic] : atraumatic [EOMI] : extra ocular movement intact [Supple] : supple [No Supraclavicular Adenopathy] : no supraclavicular adenopathy [No Cervical Adenopathy] : no cervical adenopathy [Examined in the supine and seated position] : examined in the supine and seated position [No dominant masses] : no dominant masses in right breast  [No dominant masses] : no dominant masses left breast [No Nipple Retraction] : no left nipple retraction [No Nipple Discharge] : no left nipple discharge [Breast Mass Right Breast ___cm] : no masses [Breast Mass Left Breast ___cm] : no masses [No Axillary Lymphadenopathy] : no left axillary lymphadenopathy [No Edema] : no edema [No Rashes] : no rashes [No Ulceration] : no ulceration [Breast Nipple Inversion] : nipples not inverted [Breast Nipple Retraction] : nipples not retracted [Breast Nipple Flattening] : nipples not flattened [Breast Nipple Fissures] : nipples not fissured [Breast Abnormal Lactation (Galactorrhea)] : no galactorrhea [Breast Abnormal Secretion Bloody Fluid] : no bloody discharge [Breast Abnormal Secretion Opalescent Fluid] : no milky discharge [Breast Abnormal Secretion Serous Fluid] : no serous discharge [de-identified] : On exam, the patient has B-cup breasts.  She has healed well from her right breast wide excision for this flat epithelial atypia and has no suspicious findings in either breast.  She has some typical postbiopsy changes from her recent MRI guided biopsy in the right breast upper outer quadrant.  She has no axillary, supraclavicular, or cervical adenopathy. [de-identified] : Status post right breast wide excision for flat epithelial atypia with no suspicious findings.  Right breast upper outer quadrant MRI guided core biopsy changes.

## 2024-05-13 NOTE — ASSESSMENT
[FreeTextEntry1] : The patient is a 48-year-old G3, P3 female with likely Haitian Ashkenazi Restorationist heritage who underwent menarche at age 11 and had her first child at age 33. She has a strong family history with her maternal grandmother who had breast cancer in her 50s and possibly had ovarian cancer. Her paternal grandfather had liver cancer at age 70 and a maternal uncle had prostate cancer at age 70. The patient's brother had an appendiceal cancer, questionable carcinoid. The patient was found to have some right breast upper lateral calcifications seen on screening mammography on May 12, 2022. Diagnostic right breast mammography on December 21, 2022 performed here at [place] showed some increase in the calcifications in the right breast upper central region and stereotactic biopsy on January 11, 2023 showed some flat epithelial atypia associated with calcifications as well as pseudo angiomatous stromal hyperplasia and a "Top-Hat" clip was placed at that site. She then had another area of calcifications biopsied in the right breast upper outer quadrant on January 18, 2023 which was benign just showing fibrocystic change and apocrine metaplasia where a "stoplight" clip was placed. I did calculate out her Ligia risk model and she is a lifetime risk of 36%. I reviewed her imaging and these calcifications were really fairly faint and not overly suspicious. She underwent a bilateral breast MRI on February 7, 2023 which showed some non-mass-like enhancement in the left 3:00 periareolar region and MRI guided core biopsy performed on March 15, 2023 just showed benign fibrocystic change. She then underwent a right breast upper outer quadrant partial mastectomy with Hologic localization for this flat epithelial atypia performed on April 27, 2023. The final pathology showed some further atypical duct hyperplasia but no cancer. She understood her increased risk of breast cancer with atypia and I did talk to her about tamoxifen for risk reduction. She researched the possible benefits of endocrine therapy and has decided against it. She understands the need for clinical follow-up due to her increased risk.  She underwent her last bilateral mammography and ultrasound which was reviewed from November 10, 2023 performed at [facility] showing some postsurgical changes in the right breast as well as some benign bilateral clips remaining from previous benign biopsies.  She underwent her last breast MRI on April 19, 2024 showing NME in the right breast UOQ and she underwent a right breast UOQ MRI guided core biopsy on May 2, 2024 which showed multifocal ALH.  On exam today, she has healed well from her right breast wide excision for this ductal atypia and has some new biopsy changes from her recent MRI guided core biopsy but has no suspicious findings.  I again spoke to her with her  present about her increased risk of breast cancer given this finding of atypical lobular hyperplasia around the same area where she had the previous excision for atypical duct hyperplasia.  I still feel like close surveillance should be recommended and I did speak to her again about using tamoxifen for risk reduction.  She would like to see Dr. Nolan in consultation and feels that taking tamoxifen for risk reduction may be the best option at this point.  I agree and feel that risk reduction prophylactic mastectomy would be a little bit aggressive at this stage.  I did talk to her about risks/side effects of tamoxifen including increased risk of endometrial cancer as well as blood clotting.  She was told to speak to her gynecologist prior to going on the tamoxifen as well.  She will speak to Dr. Nolan first and then decide if she will start tamoxifen and I would like to see her in 6 months around November 2024.  She will be due for her next bilateral mammography and ultrasound in November 2024 and was given prescriptions.  I would like her to just continue routine screening MRI next year in April 2025 as long as her mammography and ultrasound in November 2024 are unchanged.

## 2024-05-13 NOTE — ADDENDUM
[FreeTextEntry1] : I spent greater than 75% of consultation in face-to-face counseling and coordination care in this patient at increased risk for breast cancer with a recently new right breast upper outer quadrant MRI guided core biopsy showing atypical lobular hyperplasia.

## 2024-05-15 ENCOUNTER — APPOINTMENT (OUTPATIENT)
Dept: FAMILY MEDICINE | Facility: CLINIC | Age: 49
End: 2024-05-15
Payer: COMMERCIAL

## 2024-05-15 VITALS
BODY MASS INDEX: 25.61 KG/M2 | SYSTOLIC BLOOD PRESSURE: 122 MMHG | OXYGEN SATURATION: 98 % | WEIGHT: 150 LBS | DIASTOLIC BLOOD PRESSURE: 84 MMHG | TEMPERATURE: 98.1 F | HEART RATE: 73 BPM | HEIGHT: 64 IN

## 2024-05-15 DIAGNOSIS — N60.91 UNSPECIFIED BENIGN MAMMARY DYSPLASIA OF RIGHT BREAST: ICD-10-CM

## 2024-05-15 DIAGNOSIS — K21.9 GASTRO-ESOPHAGEAL REFLUX DISEASE W/OUT ESOPHAGITIS: ICD-10-CM

## 2024-05-15 DIAGNOSIS — M54.50 LOW BACK PAIN, UNSPECIFIED: ICD-10-CM

## 2024-05-15 DIAGNOSIS — G43.909 MIGRAINE, UNSPECIFIED, NOT INTRACTABLE, W/OUT STATUS MIGRAINOSUS: ICD-10-CM

## 2024-05-15 PROCEDURE — 99204 OFFICE O/P NEW MOD 45 MIN: CPT

## 2024-05-15 PROCEDURE — G2211 COMPLEX E/M VISIT ADD ON: CPT

## 2024-05-15 RX ORDER — RIZATRIPTAN BENZOATE 5 MG/1
5 TABLET ORAL
Qty: 1 | Refills: 0 | Status: ACTIVE | COMMUNITY
Start: 2024-05-15 | End: 1900-01-01

## 2024-05-15 NOTE — ASSESSMENT
[FreeTextEntry1] : 48-year-old female presenting to establish care and medication refill.  Reviewed patient's medical chart, notes from pcp, breast surgeon, GI and pain management, labs, imagings, Will refill rizatriptan 5mg prn for migraine. Recommended to hydrate.  f/u for a cpe

## 2024-05-15 NOTE — HISTORY OF PRESENT ILLNESS
[FreeTextEntry8] : 48-year-old female presenting to establish care and medication refill. Patient is on rizatriptan for migraine and hasn't needed for a long time but has been having recurrent migraine now and would like rizatriptan refilled. PMH: GERD, Lower back pain, Asthma, Breast calcifications PSH: Right breast calcification s/p multiple biopsies, was recommended to start tamoxifen.  and tubal ligation .  FMH: Maternal Grandmother - BC/ovarian cancer - early 50s. Maternal grandfather - DM, COPD. Mother - CLL, macular degeneration. Brother - rare cancer on appendix.  SH: 3 kids - 13 (twins), 16. works as psychologist at [employer] Pantoprazole, famotidine, melatonin, cyclobenzaprine Prior PCP: Dr. Lynch  Breast Surgeon: Dr. Sheldon  Pain Management: Dr. Odonnell for back GI - Dr. abrams

## 2024-05-15 NOTE — HEALTH RISK ASSESSMENT
[Yes] : Yes [2 - 4 times a month (2 pts)] : 2-4 times a month (2 points) [1 or 2 (0 pts)] : 1 or 2 (0 points) [No falls in past year] : Patient reported no falls in the past year [0] : 2) Feeling down, depressed, or hopeless: Not at all (0) [PHQ-2 Negative - No further assessment needed] : PHQ-2 Negative - No further assessment needed [Never] : Never [Never (0 pts)] : Never (0 points) [Audit-CScore] : 2 [[id number]] : 0

## 2024-05-20 ENCOUNTER — TRANSCRIPTION ENCOUNTER (OUTPATIENT)
Age: 49
End: 2024-05-20

## 2024-06-20 ENCOUNTER — APPOINTMENT (OUTPATIENT)
Dept: HEMATOLOGY ONCOLOGY | Facility: CLINIC | Age: 49
End: 2024-06-20

## 2024-06-25 ENCOUNTER — APPOINTMENT (OUTPATIENT)
Dept: GASTROENTEROLOGY | Facility: CLINIC | Age: 49
End: 2024-06-25

## 2024-07-16 ENCOUNTER — RESULT REVIEW (OUTPATIENT)
Age: 49
End: 2024-07-16

## 2024-07-16 ENCOUNTER — APPOINTMENT (OUTPATIENT)
Dept: HEMATOLOGY ONCOLOGY | Facility: CLINIC | Age: 49
End: 2024-07-16
Payer: COMMERCIAL

## 2024-07-16 VITALS
HEIGHT: 64 IN | TEMPERATURE: 98.7 F | OXYGEN SATURATION: 97 % | RESPIRATION RATE: 18 BRPM | HEART RATE: 70 BPM | DIASTOLIC BLOOD PRESSURE: 92 MMHG | SYSTOLIC BLOOD PRESSURE: 154 MMHG | BODY MASS INDEX: 27.26 KG/M2 | WEIGHT: 159.7 LBS

## 2024-07-16 DIAGNOSIS — N60.91 UNSPECIFIED BENIGN MAMMARY DYSPLASIA OF RIGHT BREAST: ICD-10-CM

## 2024-07-16 DIAGNOSIS — Z80.42 FAMILY HISTORY OF MALIGNANT NEOPLASM OF PROSTATE: ICD-10-CM

## 2024-07-16 DIAGNOSIS — D75.839 THROMBOCYTOSIS, UNSPECIFIED: ICD-10-CM

## 2024-07-16 DIAGNOSIS — Z91.89 OTHER SPECIFIED PERSONAL RISK FACTORS, NOT ELSEWHERE CLASSIFIED: ICD-10-CM

## 2024-07-16 DIAGNOSIS — R79.89 OTHER SPECIFIED ABNORMAL FINDINGS OF BLOOD CHEMISTRY: ICD-10-CM

## 2024-07-16 PROCEDURE — 99205 OFFICE O/P NEW HI 60 MIN: CPT

## 2024-07-16 RX ORDER — TAMOXIFEN CITRATE 10 MG/1
10 TABLET, FILM COATED ORAL
Qty: 90 | Refills: 3 | Status: ACTIVE | COMMUNITY
Start: 2024-07-16 | End: 1900-01-01

## 2024-07-16 RX ORDER — MAGNESIUM GLUCONATE 27 MG(500)
500 TABLET ORAL
Refills: 0 | Status: ACTIVE | COMMUNITY

## 2024-08-15 ENCOUNTER — APPOINTMENT (OUTPATIENT)
Dept: GASTROENTEROLOGY | Facility: CLINIC | Age: 49
End: 2024-08-15
Payer: COMMERCIAL

## 2024-08-15 VITALS
DIASTOLIC BLOOD PRESSURE: 80 MMHG | OXYGEN SATURATION: 97 % | HEIGHT: 64 IN | BODY MASS INDEX: 25.61 KG/M2 | SYSTOLIC BLOOD PRESSURE: 116 MMHG | HEART RATE: 71 BPM | WEIGHT: 150 LBS

## 2024-08-15 DIAGNOSIS — Z12.11 ENCOUNTER FOR SCREENING FOR MALIGNANT NEOPLASM OF COLON: ICD-10-CM

## 2024-08-15 DIAGNOSIS — K21.9 GASTRO-ESOPHAGEAL REFLUX DISEASE W/OUT ESOPHAGITIS: ICD-10-CM

## 2024-08-15 PROCEDURE — G2211 COMPLEX E/M VISIT ADD ON: CPT

## 2024-08-15 PROCEDURE — 99214 OFFICE O/P EST MOD 30 MIN: CPT

## 2024-08-15 NOTE — ASSESSMENT
[FreeTextEntry1] : Continue omeprazole PRN. Due for a repeat colonoscopy for h/o polyp in 2025. Will repeat an EGD at that time for her chronic GERD.

## 2024-08-15 NOTE — HISTORY OF PRESENT ILLNESS
[FreeTextEntry1] : Ms. Gross returns for f/u. H/o atypical lobular hyperplasia of the breast, AHDH, GERD, , asthma.  Last seen  for recurrent LUQ discomfort, GERD, early satiety.  She recently had an episode of discomfort in her esophageal area that resolved, was in the setting of stress. She takes omeprazole PRN which helps very much, states it completely gets rid of her reflux symptoms when they occur. No dysphagia, odynophagia. No significant regurgitation.  Has alternating constipation and diarrhea, has gone on for a long time, stable. No rectal bleeding, black stool. No weight change.  EGD/colonoscopy : cecal tubular adenoma (3 mm), int hemorrhoids, normal EGD EGD : erosive gastritis

## 2024-08-23 ENCOUNTER — APPOINTMENT (OUTPATIENT)
Dept: FAMILY MEDICINE | Facility: CLINIC | Age: 49
End: 2024-08-23
Payer: COMMERCIAL

## 2024-08-23 VITALS
DIASTOLIC BLOOD PRESSURE: 76 MMHG | RESPIRATION RATE: 16 BRPM | HEIGHT: 64 IN | WEIGHT: 149 LBS | HEART RATE: 82 BPM | BODY MASS INDEX: 25.44 KG/M2 | OXYGEN SATURATION: 99 % | SYSTOLIC BLOOD PRESSURE: 107 MMHG

## 2024-08-23 DIAGNOSIS — Z00.00 ENCOUNTER FOR GENERAL ADULT MEDICAL EXAMINATION W/OUT ABNORMAL FINDINGS: ICD-10-CM

## 2024-08-23 DIAGNOSIS — Z87.39 PERSONAL HISTORY OF OTHER DISEASES OF THE MUSCULOSKELETAL SYSTEM AND CONNECTIVE TISSUE: ICD-10-CM

## 2024-08-23 DIAGNOSIS — M79.18 MYALGIA, OTHER SITE: ICD-10-CM

## 2024-08-23 DIAGNOSIS — R79.89 OTHER SPECIFIED ABNORMAL FINDINGS OF BLOOD CHEMISTRY: ICD-10-CM

## 2024-08-23 DIAGNOSIS — Z87.898 PERSONAL HISTORY OF OTHER SPECIFIED CONDITIONS: ICD-10-CM

## 2024-08-23 DIAGNOSIS — Z98.890 OTHER SPECIFIED POSTPROCEDURAL STATES: ICD-10-CM

## 2024-08-23 DIAGNOSIS — F90.9 ATTENTION-DEFICIT HYPERACTIVITY DISORDER, UNSPECIFIED TYPE: ICD-10-CM

## 2024-08-23 DIAGNOSIS — R92.0 MAMMOGRAPHIC MICROCALCIFICATION FOUND ON DIAGNOSTIC IMAGING OF BREAST: ICD-10-CM

## 2024-08-23 DIAGNOSIS — M62.838 OTHER MUSCLE SPASM: ICD-10-CM

## 2024-08-23 DIAGNOSIS — J45.909 UNSPECIFIED ASTHMA, UNCOMPLICATED: ICD-10-CM

## 2024-08-23 DIAGNOSIS — Z01.818 ENCOUNTER FOR OTHER PREPROCEDURAL EXAMINATION: ICD-10-CM

## 2024-08-23 DIAGNOSIS — M79.603 PAIN IN ARM, UNSPECIFIED: ICD-10-CM

## 2024-08-23 DIAGNOSIS — R10.13 EPIGASTRIC PAIN: ICD-10-CM

## 2024-08-23 DIAGNOSIS — Z86.018 PERSONAL HISTORY OF OTHER BENIGN NEOPLASM: ICD-10-CM

## 2024-08-23 DIAGNOSIS — M54.50 LOW BACK PAIN, UNSPECIFIED: ICD-10-CM

## 2024-08-23 DIAGNOSIS — R68.89 OTHER GENERAL SYMPTOMS AND SIGNS: ICD-10-CM

## 2024-08-23 DIAGNOSIS — Z87.09 PERSONAL HISTORY OF OTHER DISEASES OF THE RESPIRATORY SYSTEM: ICD-10-CM

## 2024-08-23 DIAGNOSIS — D50.0 IRON DEFICIENCY ANEMIA SECONDARY TO BLOOD LOSS (CHRONIC): ICD-10-CM

## 2024-08-23 DIAGNOSIS — R68.81 EARLY SATIETY: ICD-10-CM

## 2024-08-23 DIAGNOSIS — Z86.2 PERSONAL HISTORY OF DISEASES OF THE BLOOD AND BLOOD-FORMING ORGANS AND CERTAIN DISORDERS INVOLVING THE IMMUNE MECHANISM: ICD-10-CM

## 2024-08-23 DIAGNOSIS — Z12.31 ENCOUNTER FOR SCREENING MAMMOGRAM FOR MALIGNANT NEOPLASM OF BREAST: ICD-10-CM

## 2024-08-23 DIAGNOSIS — Z91.030 BEE ALLERGY STATUS: ICD-10-CM

## 2024-08-23 PROCEDURE — 99214 OFFICE O/P EST MOD 30 MIN: CPT | Mod: 25

## 2024-08-23 PROCEDURE — 99396 PREV VISIT EST AGE 40-64: CPT

## 2024-08-24 LAB
ALBUMIN SERPL ELPH-MCNC: 4.4 G/DL
ALP BLD-CCNC: 47 U/L
ALT SERPL-CCNC: 9 U/L
ANION GAP SERPL CALC-SCNC: 9 MMOL/L
AST SERPL-CCNC: 18 U/L
BILIRUB SERPL-MCNC: 0.3 MG/DL
BUN SERPL-MCNC: 13 MG/DL
CALCIUM SERPL-MCNC: 9.2 MG/DL
CHLORIDE SERPL-SCNC: 105 MMOL/L
CHOLEST SERPL-MCNC: 144 MG/DL
CO2 SERPL-SCNC: 25 MMOL/L
CREAT SERPL-MCNC: 0.68 MG/DL
EGFR: 107 ML/MIN/1.73M2
GLUCOSE SERPL-MCNC: 101 MG/DL
HCT VFR BLD CALC: 40.4 %
HDLC SERPL-MCNC: 52 MG/DL
HGB BLD-MCNC: 12.6 G/DL
LDLC SERPL CALC-MCNC: 78 MG/DL
MCHC RBC-ENTMCNC: 28.6 PG
MCHC RBC-ENTMCNC: 31.2 GM/DL
MCV RBC AUTO: 91.6 FL
NONHDLC SERPL-MCNC: 92 MG/DL
PLATELET # BLD AUTO: 421 K/UL
POTASSIUM SERPL-SCNC: 4.8 MMOL/L
PROT SERPL-MCNC: 6.7 G/DL
RBC # BLD: 4.41 M/UL
RBC # FLD: 12.7 %
SODIUM SERPL-SCNC: 139 MMOL/L
TRIGL SERPL-MCNC: 75 MG/DL
TSH SERPL-ACNC: 1.79 UIU/ML
WBC # FLD AUTO: 6.52 K/UL

## 2024-08-25 LAB
ESTIMATED AVERAGE GLUCOSE: 108 MG/DL
HBA1C MFR BLD HPLC: 5.4 %

## 2024-08-26 NOTE — HISTORY OF PRESENT ILLNESS
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

## 2024-08-26 NOTE — HEALTH RISK ASSESSMENT
[Good] : ~his/her~  mood as  good [Yes] : Yes [2 - 3 times a week (3 pts)] : 2 - 3  times a week (3 points) [1 or 2 (0 pts)] : 1 or 2 (0 points) [Never (0 pts)] : Never (0 points) [No] : In the past 12 months have you used drugs other than those required for medical reasons? No [No falls in past year] : Patient reported no falls in the past year [0] : 2) Feeling down, depressed, or hopeless: Not at all (0) [PHQ-2 Negative - No further assessment needed] : PHQ-2 Negative - No further assessment needed [Patient reported mammogram was normal] : Patient reported mammogram was normal [Patient reported PAP Smear was normal] : Patient reported PAP Smear was normal [With Family] : lives with family [Employed] : employed [Single] : single [# Of Children ___] : has [unfilled] children [Feels Safe at Home] : Feels safe at home [Reports changes in vision] : Reports changes in vision [Smoke Detector] : smoke detector [Carbon Monoxide Detector] : carbon monoxide detector [Seat Belt] :  uses seat belt [Never] : Never [Audit-CScore] : 3 [[id number]] : 0 [Change in mental status noted] : No change in mental status noted [Reports changes in hearing] : Reports no changes in hearing [Reports changes in dental health] : Reports no changes in dental health [MammogramDate] : 10/2023 [PapSmearDate] : 01/2024

## 2024-08-26 NOTE — HEALTH RISK ASSESSMENT
[Good] : ~his/her~  mood as  good [Yes] : Yes [2 - 3 times a week (3 pts)] : 2 - 3  times a week (3 points) [1 or 2 (0 pts)] : 1 or 2 (0 points) [Never (0 pts)] : Never (0 points) [No] : In the past 12 months have you used drugs other than those required for medical reasons? No [No falls in past year] : Patient reported no falls in the past year [0] : 2) Feeling down, depressed, or hopeless: Not at all (0) [PHQ-2 Negative - No further assessment needed] : PHQ-2 Negative - No further assessment needed [Patient reported mammogram was normal] : Patient reported mammogram was normal [Patient reported PAP Smear was normal] : Patient reported PAP Smear was normal [With Family] : lives with family [Employed] : employed [Single] : single [# Of Children ___] : has [unfilled] children [Feels Safe at Home] : Feels safe at home [Reports changes in vision] : Reports changes in vision [Smoke Detector] : smoke detector [Carbon Monoxide Detector] : carbon monoxide detector [Seat Belt] :  uses seat belt [Never] : Never [Audit-CScore] : 3 [[id number]] : 0 [Change in mental status noted] : No change in mental status noted [Reports changes in hearing] : Reports no changes in hearing [Reports changes in dental health] : Reports no changes in dental health [MammogramDate] : 10/2023 [PapSmearDate] : 01/2024

## 2024-08-26 NOTE — HEALTH RISK ASSESSMENT
[Good] : ~his/her~  mood as  good [Yes] : Yes [2 - 3 times a week (3 pts)] : 2 - 3  times a week (3 points) [1 or 2 (0 pts)] : 1 or 2 (0 points) [Never (0 pts)] : Never (0 points) [No] : In the past 12 months have you used drugs other than those required for medical reasons? No [No falls in past year] : Patient reported no falls in the past year [0] : 2) Feeling down, depressed, or hopeless: Not at all (0) [PHQ-2 Negative - No further assessment needed] : PHQ-2 Negative - No further assessment needed [Patient reported mammogram was normal] : Patient reported mammogram was normal [Patient reported PAP Smear was normal] : Patient reported PAP Smear was normal [With Family] : lives with family [Employed] : employed [Single] : single [# Of Children ___] : has [unfilled] children [Feels Safe at Home] : Feels safe at home [Reports changes in vision] : Reports changes in vision [Smoke Detector] : smoke detector [Carbon Monoxide Detector] : carbon monoxide detector [Seat Belt] :  uses seat belt [Never] : Never [Audit-CScore] : 3 [[id number]] : 0 [Change in mental status noted] : No change in mental status noted [Reports changes in hearing] : Reports no changes in hearing [Reports changes in dental health] : Reports no changes in dental health [MammogramDate] : 10/2023 [PapSmearDate] : 01/2024

## 2024-08-26 NOTE — HISTORY OF PRESENT ILLNESS
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

## 2024-08-26 NOTE — HISTORY OF PRESENT ILLNESS
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

## 2024-10-10 ENCOUNTER — APPOINTMENT (OUTPATIENT)
Dept: PAIN MANAGEMENT | Facility: CLINIC | Age: 49
End: 2024-10-10
Payer: COMMERCIAL

## 2024-10-10 DIAGNOSIS — M79.10 MYALGIA, UNSPECIFIED SITE: ICD-10-CM

## 2024-10-10 DIAGNOSIS — M54.16 RADICULOPATHY, LUMBAR REGION: ICD-10-CM

## 2024-10-10 PROCEDURE — 99214 OFFICE O/P EST MOD 30 MIN: CPT

## 2024-10-18 ENCOUNTER — APPOINTMENT (OUTPATIENT)
Dept: INTERNAL MEDICINE | Facility: CLINIC | Age: 49
End: 2024-10-18
Payer: COMMERCIAL

## 2024-10-18 VITALS
WEIGHT: 144 LBS | BODY MASS INDEX: 24.59 KG/M2 | SYSTOLIC BLOOD PRESSURE: 132 MMHG | DIASTOLIC BLOOD PRESSURE: 94 MMHG | TEMPERATURE: 98.4 F | HEIGHT: 64 IN | HEART RATE: 86 BPM | OXYGEN SATURATION: 100 % | RESPIRATION RATE: 16 BRPM

## 2024-10-18 DIAGNOSIS — K21.9 GASTRO-ESOPHAGEAL REFLUX DISEASE W/OUT ESOPHAGITIS: ICD-10-CM

## 2024-10-18 DIAGNOSIS — R10.33 PERIUMBILICAL PAIN: ICD-10-CM

## 2024-10-18 PROCEDURE — 99214 OFFICE O/P EST MOD 30 MIN: CPT

## 2024-10-21 LAB
ALBUMIN SERPL ELPH-MCNC: 4.2 G/DL
ALP BLD-CCNC: 40 U/L
ALT SERPL-CCNC: 10 U/L
AMYLASE/CREAT SERPL: 28 U/L
ANION GAP SERPL CALC-SCNC: 12 MMOL/L
AST SERPL-CCNC: 17 U/L
BILIRUB SERPL-MCNC: 0.3 MG/DL
BUN SERPL-MCNC: 11 MG/DL
CALCIUM SERPL-MCNC: 9.4 MG/DL
CHLORIDE SERPL-SCNC: 104 MMOL/L
CO2 SERPL-SCNC: 23 MMOL/L
CREAT SERPL-MCNC: 0.71 MG/DL
EGFR: 104 ML/MIN/1.73M2
GLUCOSE SERPL-MCNC: 104 MG/DL
HCT VFR BLD CALC: 37.3 %
HGB BLD-MCNC: 12.2 G/DL
MCHC RBC-ENTMCNC: 29 PG
MCHC RBC-ENTMCNC: 32.7 GM/DL
MCV RBC AUTO: 88.6 FL
PLATELET # BLD AUTO: 444 K/UL
POTASSIUM SERPL-SCNC: 4.1 MMOL/L
PROT SERPL-MCNC: 6.4 G/DL
RBC # BLD: 4.21 M/UL
RBC # FLD: 12.7 %
SODIUM SERPL-SCNC: 138 MMOL/L
WBC # FLD AUTO: 6.93 K/UL

## 2024-10-22 ENCOUNTER — RESULT REVIEW (OUTPATIENT)
Age: 49
End: 2024-10-22

## 2024-10-22 ENCOUNTER — APPOINTMENT (OUTPATIENT)
Dept: HEMATOLOGY ONCOLOGY | Facility: CLINIC | Age: 49
End: 2024-10-22

## 2024-10-22 ENCOUNTER — LABORATORY RESULT (OUTPATIENT)
Age: 49
End: 2024-10-22

## 2024-10-22 VITALS
WEIGHT: 146.4 LBS | BODY MASS INDEX: 25 KG/M2 | HEART RATE: 76 BPM | HEIGHT: 64 IN | RESPIRATION RATE: 16 BRPM | SYSTOLIC BLOOD PRESSURE: 128 MMHG | DIASTOLIC BLOOD PRESSURE: 87 MMHG | OXYGEN SATURATION: 98 % | TEMPERATURE: 97.9 F

## 2024-10-22 DIAGNOSIS — N60.91 UNSPECIFIED BENIGN MAMMARY DYSPLASIA OF RIGHT BREAST: ICD-10-CM

## 2024-10-22 DIAGNOSIS — B37.9 CANDIDIASIS, UNSPECIFIED: ICD-10-CM

## 2024-10-22 DIAGNOSIS — Z91.89 OTHER SPECIFIED PERSONAL RISK FACTORS, NOT ELSEWHERE CLASSIFIED: ICD-10-CM

## 2024-10-22 DIAGNOSIS — D75.839 THROMBOCYTOSIS, UNSPECIFIED: ICD-10-CM

## 2024-10-22 PROCEDURE — 99215 OFFICE O/P EST HI 40 MIN: CPT

## 2024-10-22 RX ORDER — FLUCONAZOLE 150 MG/1
150 TABLET ORAL DAILY
Qty: 1 | Refills: 0 | Status: ACTIVE | COMMUNITY
Start: 2024-10-22 | End: 1900-01-01

## 2024-10-22 RX ORDER — TIRZEPATIDE 7.5 MG/.5ML
INJECTION, SOLUTION SUBCUTANEOUS
Refills: 0 | Status: ACTIVE | COMMUNITY

## 2024-11-13 ENCOUNTER — NON-APPOINTMENT (OUTPATIENT)
Age: 49
End: 2024-11-13

## 2024-11-18 ENCOUNTER — RESULT REVIEW (OUTPATIENT)
Age: 49
End: 2024-11-18

## 2024-11-18 ENCOUNTER — NON-APPOINTMENT (OUTPATIENT)
Age: 49
End: 2024-11-18

## 2024-11-18 DIAGNOSIS — R92.1 MAMMOGRAPHIC CALCIFICATION FOUND ON DIAGNOSTIC IMAGING OF BREAST: ICD-10-CM

## 2024-11-22 ENCOUNTER — RESULT REVIEW (OUTPATIENT)
Age: 49
End: 2024-11-22

## 2024-12-05 ENCOUNTER — APPOINTMENT (OUTPATIENT)
Dept: BREAST CENTER | Facility: CLINIC | Age: 49
End: 2024-12-05
Payer: COMMERCIAL

## 2024-12-05 VITALS
SYSTOLIC BLOOD PRESSURE: 127 MMHG | BODY MASS INDEX: 24.92 KG/M2 | DIASTOLIC BLOOD PRESSURE: 83 MMHG | HEIGHT: 64 IN | OXYGEN SATURATION: 100 % | HEART RATE: 71 BPM | WEIGHT: 146 LBS

## 2024-12-05 DIAGNOSIS — N60.91 UNSPECIFIED BENIGN MAMMARY DYSPLASIA OF RIGHT BREAST: ICD-10-CM

## 2024-12-05 DIAGNOSIS — Z12.39 ENCOUNTER FOR OTHER SCREENING FOR MALIGNANT NEOPLASM OF BREAST: ICD-10-CM

## 2024-12-05 DIAGNOSIS — Z80.41 FAMILY HISTORY OF MALIGNANT NEOPLASM OF OVARY: ICD-10-CM

## 2024-12-05 DIAGNOSIS — Z80.3 FAMILY HISTORY OF MALIGNANT NEOPLASM OF BREAST: ICD-10-CM

## 2024-12-05 DIAGNOSIS — R92.30 DENSE BREASTS, UNSPECIFIED: ICD-10-CM

## 2024-12-05 DIAGNOSIS — Z91.89 OTHER SPECIFIED PERSONAL RISK FACTORS, NOT ELSEWHERE CLASSIFIED: ICD-10-CM

## 2024-12-05 PROCEDURE — 99213 OFFICE O/P EST LOW 20 MIN: CPT

## 2024-12-17 ENCOUNTER — APPOINTMENT (OUTPATIENT)
Dept: PAIN MANAGEMENT | Facility: HOSPITAL | Age: 49
End: 2024-12-17

## 2025-01-22 RX ORDER — SODIUM SULFATE, POTASSIUM SULFATE AND MAGNESIUM SULFATE 1.6; 3.13; 17.5 G/177ML; G/177ML; G/177ML
17.5-3.13-1.6 SOLUTION ORAL
Qty: 1 | Refills: 0 | Status: ACTIVE | COMMUNITY
Start: 2025-01-22 | End: 1900-01-01

## 2025-02-21 ENCOUNTER — APPOINTMENT (OUTPATIENT)
Dept: HEMATOLOGY ONCOLOGY | Facility: CLINIC | Age: 50
End: 2025-02-21

## 2025-02-21 ENCOUNTER — APPOINTMENT (OUTPATIENT)
Dept: INTERNAL MEDICINE | Facility: CLINIC | Age: 50
End: 2025-02-21

## 2025-02-25 ENCOUNTER — APPOINTMENT (OUTPATIENT)
Dept: HEMATOLOGY ONCOLOGY | Facility: CLINIC | Age: 50
End: 2025-02-25

## 2025-02-28 ENCOUNTER — NON-APPOINTMENT (OUTPATIENT)
Age: 50
End: 2025-02-28

## 2025-04-24 ENCOUNTER — NON-APPOINTMENT (OUTPATIENT)
Age: 50
End: 2025-04-24

## 2025-04-25 ENCOUNTER — APPOINTMENT (OUTPATIENT)
Dept: GASTROENTEROLOGY | Facility: HOSPITAL | Age: 50
End: 2025-04-25

## 2025-04-26 ENCOUNTER — NON-APPOINTMENT (OUTPATIENT)
Age: 50
End: 2025-04-26

## 2025-04-28 ENCOUNTER — APPOINTMENT (OUTPATIENT)
Dept: PAIN MANAGEMENT | Facility: CLINIC | Age: 50
End: 2025-04-28
Payer: COMMERCIAL

## 2025-04-28 VITALS — SYSTOLIC BLOOD PRESSURE: 133 MMHG | DIASTOLIC BLOOD PRESSURE: 91 MMHG | HEART RATE: 91 BPM | OXYGEN SATURATION: 99 %

## 2025-04-28 DIAGNOSIS — M54.12 RADICULOPATHY, CERVICAL REGION: ICD-10-CM

## 2025-04-28 DIAGNOSIS — Z87.39 PERSONAL HISTORY OF OTHER DISEASES OF THE MUSCULOSKELETAL SYSTEM AND CONNECTIVE TISSUE: ICD-10-CM

## 2025-04-28 DIAGNOSIS — M47.812 SPONDYLOSIS W/OUT MYELOPATHY OR RADICULOPATHY, CERVICAL REGION: ICD-10-CM

## 2025-04-28 DIAGNOSIS — M79.10 MYALGIA, UNSPECIFIED SITE: ICD-10-CM

## 2025-04-28 DIAGNOSIS — M54.16 RADICULOPATHY, LUMBAR REGION: ICD-10-CM

## 2025-04-28 PROCEDURE — 99214 OFFICE O/P EST MOD 30 MIN: CPT

## 2025-04-28 PROCEDURE — G2211 COMPLEX E/M VISIT ADD ON: CPT | Mod: NC

## 2025-04-28 RX ORDER — CYCLOBENZAPRINE HYDROCHLORIDE 5 MG/1
5 TABLET, FILM COATED ORAL 3 TIMES DAILY
Qty: 90 | Refills: 0 | Status: ACTIVE | COMMUNITY
Start: 2025-04-28 | End: 1900-01-01

## 2025-05-02 ENCOUNTER — RESULT REVIEW (OUTPATIENT)
Age: 50
End: 2025-05-02

## 2025-05-02 ENCOUNTER — APPOINTMENT (OUTPATIENT)
Dept: GASTROENTEROLOGY | Facility: HOSPITAL | Age: 50
End: 2025-05-02

## 2025-05-12 ENCOUNTER — NON-APPOINTMENT (OUTPATIENT)
Age: 50
End: 2025-05-12

## 2025-05-14 ENCOUNTER — APPOINTMENT (OUTPATIENT)
Dept: DERMATOLOGY | Facility: CLINIC | Age: 50
End: 2025-05-14
Payer: COMMERCIAL

## 2025-05-14 VITALS — HEIGHT: 63 IN | WEIGHT: 140 LBS | BODY MASS INDEX: 24.8 KG/M2

## 2025-05-14 DIAGNOSIS — D18.01 HEMANGIOMA OF SKIN AND SUBCUTANEOUS TISSUE: ICD-10-CM

## 2025-05-14 DIAGNOSIS — L71.9 ROSACEA, UNSPECIFIED: ICD-10-CM

## 2025-05-14 DIAGNOSIS — L20.9 ATOPIC DERMATITIS, UNSPECIFIED: ICD-10-CM

## 2025-05-14 DIAGNOSIS — L82.1 OTHER SEBORRHEIC KERATOSIS: ICD-10-CM

## 2025-05-14 PROCEDURE — 99204 OFFICE O/P NEW MOD 45 MIN: CPT

## 2025-05-14 RX ORDER — OLOPATADINE HYDROCHLORIDE 7 MG/ML
0.7 SOLUTION OPHTHALMIC
Refills: 0 | Status: ACTIVE | COMMUNITY

## 2025-05-14 RX ORDER — TRIAMCINOLONE ACETONIDE 1 MG/G
0.1 CREAM TOPICAL
Qty: 1 | Refills: 0 | Status: ACTIVE | COMMUNITY
Start: 2025-05-14 | End: 1900-01-01

## 2025-05-14 RX ORDER — METRONIDAZOLE 7.5 MG/G
0.75 CREAM TOPICAL
Qty: 1 | Refills: 2 | Status: ACTIVE | COMMUNITY
Start: 2025-05-14 | End: 1900-01-01

## 2025-05-19 ENCOUNTER — RESULT REVIEW (OUTPATIENT)
Age: 50
End: 2025-05-19

## 2025-05-21 ENCOUNTER — APPOINTMENT (OUTPATIENT)
Dept: PAIN MANAGEMENT | Facility: CLINIC | Age: 50
End: 2025-05-21
Payer: COMMERCIAL

## 2025-05-21 DIAGNOSIS — M54.16 RADICULOPATHY, LUMBAR REGION: ICD-10-CM

## 2025-05-21 DIAGNOSIS — M47.812 SPONDYLOSIS W/OUT MYELOPATHY OR RADICULOPATHY, CERVICAL REGION: ICD-10-CM

## 2025-05-21 DIAGNOSIS — M51.369: ICD-10-CM

## 2025-05-21 DIAGNOSIS — M79.10 MYALGIA, UNSPECIFIED SITE: ICD-10-CM

## 2025-05-21 DIAGNOSIS — M54.12 RADICULOPATHY, CERVICAL REGION: ICD-10-CM

## 2025-05-21 PROCEDURE — G2211 COMPLEX E/M VISIT ADD ON: CPT | Mod: NC,95

## 2025-05-21 PROCEDURE — 99214 OFFICE O/P EST MOD 30 MIN: CPT | Mod: 95

## 2025-05-23 ENCOUNTER — RESULT REVIEW (OUTPATIENT)
Age: 50
End: 2025-05-23

## 2025-05-27 ENCOUNTER — NON-APPOINTMENT (OUTPATIENT)
Age: 50
End: 2025-05-27

## 2025-05-27 PROBLEM — M51.369 ANNULAR TEAR OF LUMBAR DISC: Status: ACTIVE | Noted: 2021-11-02

## 2025-06-05 ENCOUNTER — APPOINTMENT (OUTPATIENT)
Dept: BREAST CENTER | Facility: CLINIC | Age: 50
End: 2025-06-05
Payer: COMMERCIAL

## 2025-06-05 VITALS
DIASTOLIC BLOOD PRESSURE: 81 MMHG | OXYGEN SATURATION: 99 % | SYSTOLIC BLOOD PRESSURE: 111 MMHG | WEIGHT: 140 LBS | HEIGHT: 63 IN | HEART RATE: 85 BPM | BODY MASS INDEX: 24.8 KG/M2

## 2025-06-05 DIAGNOSIS — R92.30 DENSE BREASTS, UNSPECIFIED: ICD-10-CM

## 2025-06-05 DIAGNOSIS — N60.91 UNSPECIFIED BENIGN MAMMARY DYSPLASIA OF RIGHT BREAST: ICD-10-CM

## 2025-06-05 DIAGNOSIS — Z80.3 FAMILY HISTORY OF MALIGNANT NEOPLASM OF BREAST: ICD-10-CM

## 2025-06-05 DIAGNOSIS — Z12.39 ENCOUNTER FOR OTHER SCREENING FOR MALIGNANT NEOPLASM OF BREAST: ICD-10-CM

## 2025-06-05 DIAGNOSIS — Z80.41 FAMILY HISTORY OF MALIGNANT NEOPLASM OF OVARY: ICD-10-CM

## 2025-06-05 PROCEDURE — 99213 OFFICE O/P EST LOW 20 MIN: CPT

## 2025-06-21 ENCOUNTER — NON-APPOINTMENT (OUTPATIENT)
Age: 50
End: 2025-06-21

## 2025-07-10 ENCOUNTER — RX RENEWAL (OUTPATIENT)
Age: 50
End: 2025-07-10

## 2025-08-08 ENCOUNTER — RX RENEWAL (OUTPATIENT)
Age: 50
End: 2025-08-08

## 2025-08-25 ENCOUNTER — APPOINTMENT (OUTPATIENT)
Dept: PAIN MANAGEMENT | Facility: CLINIC | Age: 50
End: 2025-08-25
Payer: COMMERCIAL

## 2025-08-25 VITALS
SYSTOLIC BLOOD PRESSURE: 141 MMHG | RESPIRATION RATE: 16 BRPM | HEART RATE: 73 BPM | DIASTOLIC BLOOD PRESSURE: 90 MMHG | OXYGEN SATURATION: 97 %

## 2025-08-25 DIAGNOSIS — M54.16 RADICULOPATHY, LUMBAR REGION: ICD-10-CM

## 2025-08-25 PROCEDURE — 64483 NJX AA&/STRD TFRM EPI L/S 1: CPT | Mod: 50

## 2025-09-10 ENCOUNTER — APPOINTMENT (OUTPATIENT)
Dept: DERMATOLOGY | Facility: CLINIC | Age: 50
End: 2025-09-10
Payer: COMMERCIAL

## 2025-09-10 VITALS — HEIGHT: 63 IN | WEIGHT: 140 LBS | BODY MASS INDEX: 24.8 KG/M2

## 2025-09-10 DIAGNOSIS — L82.1 OTHER SEBORRHEIC KERATOSIS: ICD-10-CM

## 2025-09-10 DIAGNOSIS — D22.9 MELANOCYTIC NEVI, UNSPECIFIED: ICD-10-CM

## 2025-09-10 DIAGNOSIS — L20.9 ATOPIC DERMATITIS, UNSPECIFIED: ICD-10-CM

## 2025-09-10 PROCEDURE — 99214 OFFICE O/P EST MOD 30 MIN: CPT

## 2025-09-10 RX ORDER — TRIAMCINOLONE ACETONIDE 1 MG/G
0.1 CREAM TOPICAL
Qty: 1 | Refills: 0 | Status: ACTIVE | COMMUNITY
Start: 2025-09-10 | End: 1900-01-01

## 2025-09-15 ENCOUNTER — APPOINTMENT (OUTPATIENT)
Dept: PAIN MANAGEMENT | Facility: CLINIC | Age: 50
End: 2025-09-15
Payer: COMMERCIAL

## 2025-09-15 VITALS
BODY MASS INDEX: 24.8 KG/M2 | WEIGHT: 140 LBS | SYSTOLIC BLOOD PRESSURE: 149 MMHG | OXYGEN SATURATION: 100 % | HEIGHT: 63 IN | DIASTOLIC BLOOD PRESSURE: 87 MMHG | HEART RATE: 79 BPM

## 2025-09-15 DIAGNOSIS — M47.812 SPONDYLOSIS W/OUT MYELOPATHY OR RADICULOPATHY, CERVICAL REGION: ICD-10-CM

## 2025-09-15 DIAGNOSIS — M54.12 RADICULOPATHY, CERVICAL REGION: ICD-10-CM

## 2025-09-15 DIAGNOSIS — M79.10 MYALGIA, UNSPECIFIED SITE: ICD-10-CM

## 2025-09-15 DIAGNOSIS — M54.16 RADICULOPATHY, LUMBAR REGION: ICD-10-CM

## 2025-09-15 PROCEDURE — G2211 COMPLEX E/M VISIT ADD ON: CPT | Mod: NC

## 2025-09-15 PROCEDURE — 99214 OFFICE O/P EST MOD 30 MIN: CPT
